# Patient Record
Sex: MALE | Race: WHITE | NOT HISPANIC OR LATINO | Employment: STUDENT | URBAN - METROPOLITAN AREA
[De-identification: names, ages, dates, MRNs, and addresses within clinical notes are randomized per-mention and may not be internally consistent; named-entity substitution may affect disease eponyms.]

---

## 2017-01-20 ENCOUNTER — HOSPITAL ENCOUNTER (OUTPATIENT)
Dept: LAB | Facility: MEDICAL CENTER | Age: 22
End: 2017-01-20
Attending: NURSE PRACTITIONER
Payer: COMMERCIAL

## 2017-01-20 ENCOUNTER — PATIENT MESSAGE (OUTPATIENT)
Dept: MEDICAL GROUP | Facility: PHYSICIAN GROUP | Age: 22
End: 2017-01-20

## 2017-01-20 ENCOUNTER — OFFICE VISIT (OUTPATIENT)
Dept: MEDICAL GROUP | Facility: PHYSICIAN GROUP | Age: 22
End: 2017-01-20
Payer: COMMERCIAL

## 2017-01-20 VITALS
OXYGEN SATURATION: 97 % | BODY MASS INDEX: 25.05 KG/M2 | HEART RATE: 68 BPM | SYSTOLIC BLOOD PRESSURE: 140 MMHG | HEIGHT: 70 IN | TEMPERATURE: 97.3 F | WEIGHT: 175 LBS | DIASTOLIC BLOOD PRESSURE: 82 MMHG | RESPIRATION RATE: 12 BRPM

## 2017-01-20 DIAGNOSIS — Z11.59 NEED FOR HEPATITIS B SCREENING TEST: ICD-10-CM

## 2017-01-20 DIAGNOSIS — S09.93XA INJURY OF JAW, INITIAL ENCOUNTER: ICD-10-CM

## 2017-01-20 DIAGNOSIS — Z11.1 SCREENING FOR TUBERCULOSIS: ICD-10-CM

## 2017-01-20 LAB — HBV SURFACE AB SER RIA-ACNC: <3.1 MIU/ML (ref 0–10)

## 2017-01-20 PROCEDURE — 36415 COLL VENOUS BLD VENIPUNCTURE: CPT

## 2017-01-20 PROCEDURE — 86706 HEP B SURFACE ANTIBODY: CPT

## 2017-01-20 PROCEDURE — 86580 TB INTRADERMAL TEST: CPT | Performed by: NURSE PRACTITIONER

## 2017-01-20 PROCEDURE — 99212 OFFICE O/P EST SF 10 MIN: CPT | Performed by: NURSE PRACTITIONER

## 2017-01-20 ASSESSMENT — PATIENT HEALTH QUESTIONNAIRE - PHQ9: CLINICAL INTERPRETATION OF PHQ2 SCORE: 0

## 2017-01-20 NOTE — Clinical Note
January 20, 2017         Patient: Navarro Clark   YOB: 1995   Date of Visit: 1/20/2017           To Whom it May Concern:    Navarro Clark was seen in my clinic on 1/20/2017 for evaluation and is safe to return to work anytime.    If you have any questions or concerns, please don't hesitate to call.        Sincerely,           CANDIDA Rodriguez  Electronically Signed

## 2017-01-20 NOTE — PROGRESS NOTES
"Subjective:     Chief Complaint   Patient presents with   • Jaw Pain     injury to jaw during basketball last night   • Immunizations     needing 2 step TB test and Hep B tieter       HPI  Navarro Clark is a 21 y.o. male here today for complaints of left-sided jaw pain. He reports that he was playing basketball last night and got hit in the chin from the front with the basketball. He reports he felt his job without a place and it made a very loud popping noise that everyone was able to hear. It now is causing pain to bite down and he feels like his jaw is moving. There was no bleeding at the time of injury. He denies any numbness of the face, drooling, vision changes, headache, or difficulty swallowing.     Additionally, patient will be attending the [employer] for further schooling and it is recommended that he get a TB test and hepatitis B titer    Diagnoses of Injury of jaw, initial encounter, Need for hepatitis B screening test, and Screening for tuberculosis were pertinent to this visit.    Allergies: Nkda  Current medicines (including changes today)  Current Outpatient Prescriptions   Medication Sig Dispense Refill   • benzonatate (TESSALON) 100 MG Cap Take 1 Cap by mouth 3 times a day as needed for Cough. 30 Cap 0     No current facility-administered medications for this visit.       He  has a past medical history of Allergy and Fracture clavicle-closed. He also has no past medical history of Congestive heart failure (CMS-[facility]), Cancer (CMS-[facility]), Infectious disease, COPD, Diabetes, ASTHMA, CAD (coronary artery disease), Stroke (CMS-[facility]), Seizure disorder (CMS-[facility]), Hypertension, Renal disorder, Liver disease, or Psychiatric disorder.      ROS  As stated in HPI      Objective:     Blood pressure 140/82, pulse 68, temperature 36.3 °C (97.3 °F), resp. rate 12, height 1.778 m (5' 10\"), weight 79.379 kg (175 lb), SpO2 97 %. Body mass index is 25.11 kg/(m^2).  Physical Exam:  General: Alert, oriented, in no acute " distress.  Eye contact is good, speech goal directed, affect calm  HEENT: conjunctiva non-injected, sclera non-icteric, EOMs intact.   Oral mucous membranes pink and moist with no lesions. Oropharynx without erythema, or exudate.   Neck: No adenopathy or masses in the cervical or supraclavicular regions.  Range of motion of jaw symmetrical without any popping, clicking, or asymmetry.   No edema or deformity of jaw to palpation        Assessment and Plan:   The following treatment plan was discussed  1. Injury of jaw, initial encounter  symptoms appear to be of TMJ etiology s/p injury with no sign of fracture or malalignment . Recommended soft foods, ice, and avoidance of large movements of opening the jaw wide.     Counseled that CT scan is the only test that I could provide that would give true information on fracture, but he could call his dentist to have x-rays done there to evaluate for any fracture as well.     Red flag warning signs reviewed with patient    2. Need for hepatitis B screening test  HEP B SURFACE AB   3. Screening for tuberculosis  PPD SKIN TEST       Followup: Return if symptoms worsen or fail to improve. sooner should new symptoms or problems arise.

## 2017-01-23 ENCOUNTER — APPOINTMENT (OUTPATIENT)
Dept: MEDICAL GROUP | Facility: PHYSICIAN GROUP | Age: 22
End: 2017-01-23
Payer: COMMERCIAL

## 2017-01-23 ENCOUNTER — HOSPITAL ENCOUNTER (OUTPATIENT)
Dept: LAB | Facility: MEDICAL CENTER | Age: 22
End: 2017-01-23
Attending: INTERNAL MEDICINE
Payer: COMMERCIAL

## 2017-01-23 ENCOUNTER — NON-PROVIDER VISIT (OUTPATIENT)
Dept: URGENT CARE | Facility: PHYSICIAN GROUP | Age: 22
End: 2017-01-23

## 2017-01-23 ENCOUNTER — PATIENT MESSAGE (OUTPATIENT)
Dept: FAMILY PLANNING/WOMEN'S HEALTH CLINIC | Facility: OTHER | Age: 22
End: 2017-01-23

## 2017-01-23 DIAGNOSIS — Z11.59 NEED FOR HEPATITIS B SCREENING TEST: ICD-10-CM

## 2017-01-23 LAB — TB WHEAL 3D P 5 TU DIAM: NORMAL MM

## 2017-01-23 NOTE — PROGRESS NOTES
Navarro Clark is a 21 y.o. male here for a non-provider visit for PPD reading -- Step 1 of 2.      Resulted in Epic under original non-provider visit? Yes   TB evaluation questionnaire scanned into chart and original given to pt?Yes    If greater than 0 mm, result verified by [employer] MA (indicate provider who verified)    If Step 1 of 2, when is pt returning for second step (delete if N/A): 1/27-1/30/17    Routed to PCP? No

## 2017-01-26 ENCOUNTER — NON-PROVIDER VISIT (OUTPATIENT)
Dept: MEDICAL GROUP | Facility: PHYSICIAN GROUP | Age: 22
End: 2017-01-26
Payer: COMMERCIAL

## 2017-01-26 ENCOUNTER — TELEPHONE (OUTPATIENT)
Dept: MEDICAL GROUP | Facility: PHYSICIAN GROUP | Age: 22
End: 2017-01-26

## 2017-01-26 DIAGNOSIS — Z23 NEED FOR HEPATITIS B VACCINATION: ICD-10-CM

## 2017-01-26 DIAGNOSIS — Z23 NEED FOR VACCINATION: ICD-10-CM

## 2017-01-26 PROCEDURE — 90471 IMMUNIZATION ADMIN: CPT | Performed by: NURSE PRACTITIONER

## 2017-01-26 PROCEDURE — 90746 HEPB VACCINE 3 DOSE ADULT IM: CPT | Performed by: NURSE PRACTITIONER

## 2017-01-26 NOTE — TELEPHONE ENCOUNTER
Pt is needing an order for his Hep B.  He was told that if he gets this one he may not need any other Hep B vaccines.... Should he get another blood test to see if he's immune in a month?

## 2017-01-27 ENCOUNTER — NON-PROVIDER VISIT (OUTPATIENT)
Dept: URGENT CARE | Facility: PHYSICIAN GROUP | Age: 22
End: 2017-01-27
Payer: COMMERCIAL

## 2017-01-27 NOTE — TELEPHONE ENCOUNTER
Hep B is always given in 3 dose series to adults who require it as the immunity usually lasts only 20 years and they require the full 3 doses. Hep B vaccine ordered     SIMONE Rodriguez.

## 2017-01-27 NOTE — NON-PROVIDER
"Navarro Clark is a 21 y.o. male here for a non-provider visit for:   HEPATITIS B 1 of 3    Reason for immunization: lack of immunity demonstrated on prior labs or testing  Immunization records indicate need for vaccine: Yes  Minimum interval has been met for this vaccine: Yes  ABN completed: Yes    VIS Dated   was given to patient Yes  All IAC Questionnaire questions were answered “No.\"    Patient tolerated injection and no adverse effects were observed or reported yes!.    Pt scheduled for next dose in series: Yes       "

## 2017-01-30 ENCOUNTER — NON-PROVIDER VISIT (OUTPATIENT)
Dept: URGENT CARE | Facility: PHYSICIAN GROUP | Age: 22
End: 2017-01-30

## 2017-01-30 DIAGNOSIS — Z11.1 PPD SCREENING TEST: ICD-10-CM

## 2017-01-30 LAB — TB WHEAL 3D P 5 TU DIAM: NORMAL MM

## 2017-01-30 PROCEDURE — 86580 TB INTRADERMAL TEST: CPT | Performed by: NURSE PRACTITIONER

## 2017-03-02 ENCOUNTER — NON-PROVIDER VISIT (OUTPATIENT)
Dept: MEDICAL GROUP | Facility: PHYSICIAN GROUP | Age: 22
End: 2017-03-02
Payer: COMMERCIAL

## 2017-03-02 DIAGNOSIS — Z23 NEED FOR HEPATITIS B VACCINATION: ICD-10-CM

## 2017-03-02 PROCEDURE — 90746 HEPB VACCINE 3 DOSE ADULT IM: CPT | Performed by: FAMILY MEDICINE

## 2017-03-02 PROCEDURE — 90471 IMMUNIZATION ADMIN: CPT | Performed by: FAMILY MEDICINE

## 2017-03-02 NOTE — NON-PROVIDER
"Navarro Clark is a 21 y.o. male here for a non-provider visit for:   HEPATITIS B 2 of 3    Reason for immunization: continue or complete series started at the office  Immunization records indicate need for vaccine: Yes  Minimum interval has been met for this vaccine: Yes  ABN completed: Not Indicated    VIS Dated   was given to patient Yes  All IAC Questionnaire questions were answered “No.\"    Patient tolerated injection and no adverse effects were observed or reported yes.    Pt scheduled for next dose in series: Yes       "

## 2017-03-06 ENCOUNTER — OFFICE VISIT (OUTPATIENT)
Dept: MEDICAL GROUP | Facility: PHYSICIAN GROUP | Age: 22
End: 2017-03-06
Payer: COMMERCIAL

## 2017-03-06 ENCOUNTER — HOSPITAL ENCOUNTER (OUTPATIENT)
Dept: RADIOLOGY | Facility: MEDICAL CENTER | Age: 22
End: 2017-03-06
Attending: NURSE PRACTITIONER
Payer: COMMERCIAL

## 2017-03-06 VITALS
TEMPERATURE: 98.1 F | RESPIRATION RATE: 16 BRPM | HEIGHT: 70 IN | SYSTOLIC BLOOD PRESSURE: 134 MMHG | HEART RATE: 56 BPM | WEIGHT: 181 LBS | OXYGEN SATURATION: 100 % | BODY MASS INDEX: 25.91 KG/M2 | DIASTOLIC BLOOD PRESSURE: 80 MMHG

## 2017-03-06 DIAGNOSIS — S69.91XA FINGER INJURY, RIGHT, INITIAL ENCOUNTER: Primary | ICD-10-CM

## 2017-03-06 DIAGNOSIS — S69.91XA FINGER INJURY, RIGHT, INITIAL ENCOUNTER: ICD-10-CM

## 2017-03-06 PROCEDURE — 99213 OFFICE O/P EST LOW 20 MIN: CPT | Performed by: NURSE PRACTITIONER

## 2017-03-06 PROCEDURE — 73140 X-RAY EXAM OF FINGER(S): CPT | Mod: RT

## 2017-03-06 NOTE — MR AVS SNAPSHOT
"        Navarro Clark   3/6/2017 3:00 PM   Office Visit   MRN: [id number]    Department:  [facility]   Dept Phone:  [phone]    Description:  Male : 1995   Provider:  FABRIZIO Luong           Reason for Visit     Hand Injury rt hand injury; playing basketball 2 days ago      Allergies as of 3/6/2017     Allergen Noted Reactions    Nkda [No Known Drug Allergy] 2007         You were diagnosed with     Finger injury, right, initial encounter   [[id number]]  -  Primary       Vital Signs     Blood Pressure Pulse Temperature Respirations Height Weight    134/80 mmHg 56 36.7 °C (98.1 °F) 16 1.778 m (5' 10\") 82.101 kg (181 lb)    Body Mass Index Oxygen Saturation Smoking Status             25.97 kg/m2 100% Never Smoker          Basic Information     Date Of Birth Sex Race Ethnicity Preferred Language    1995 Male White Non- English      Problem List              ICD-10-CM Priority Class Noted - Resolved    Acne L70.9   2010 - Present    Bulging discs [id number]   2012 - Present    Alopecia L65.9   2016 - Present    Epistaxis, recurrent R04.0   2016 - Present      Health Maintenance        Date Due Completion Dates    IMM HEP A VACCINE (1 of 2 - Standard Series) 1995 ---    IMM VARICELLA (CHICKENPOX) VACCINE (1 of 2 - 2 Dose Adolescent Series) 2007 ---    IMM HEP B VACCINE (3 of 3 - Primary Series) 2017 3/2/2017, 2017    IMM DTaP/Tdap/Td Vaccine (2 - Td) 2022            Current Immunizations     HPV Quadrivalent Vaccine (GARDASIL) 2012, 3/6/2012, 2012    Hepatitis B Vaccine Recombivax (Adol/Adult) 3/2/2017, 2017    Influenza Vaccine Pediatric 2009    Influenza Vaccine Quad Inj (Pf) 10/16/2014    Influenza Vaccine Quad Inj (Preserved) 10/7/2016    Meningococcal Conjugate Vaccine MCV4 (Menactra) 2012    Tdap Vaccine 2012    Tuberculin Skin Test 2017, 2017, 2014      Below and/or attached " are the medications your provider expects you to take. Review all of your home medications and newly ordered medications with your provider and/or pharmacist. Follow medication instructions as directed by your provider and/or pharmacist. Please keep your medication list with you and share with your provider. Update the information when medications are discontinued, doses are changed, or new medications (including over-the-counter products) are added; and carry medication information at all times in the event of emergency situations     Allergies:  NKDA - (reactions not documented)               Medications  Valid as of: March 06, 2017 -  3:17 PM    Generic Name Brand Name Tablet Size Instructions for use    Benzonatate (Cap) TESSALON 100 MG Take 1 Cap by mouth 3 times a day as needed for Cough.        .                 Medicines prescribed today were sent to:     [facility][id number] - [place], [place] - [address] AT [address] [address]    Phone: [phone] Fax: [phone]    Open 24 Hours?: No      Medication refill instructions:       If your prescription bottle indicates you have medication refills left, it is not necessary to call your provider’s office. Please contact your pharmacy and they will refill your medication.    If your prescription bottle indicates you do not have any refills left, you may request refills at any time through one of the following ways: The online [employer] system (except Urgent Care), by calling your provider’s office, or by asking your pharmacy to contact your provider’s office with a refill request. Medication refills are processed only during regular business hours and may not be available until the next business day. Your provider may request additional information or to have a follow-up visit with you prior to refilling your medication.   *Please Note: Medication refills are assigned a new Rx number when refilled electronically. Your  pharmacy may indicate that no refills were authorized even though a new prescription for the same medication is available at the pharmacy. Please request the medicine by name with the pharmacy before contacting your provider for a refill.        Your To Do List     Future Labs/Procedures Complete By Expires    DX-FINGER(S) 2+ RIGHT  As directed 3/6/2018         [employer] Access Code: Activation code not generated  Current [employer] Status: Active

## 2017-03-06 NOTE — PROGRESS NOTES
"Subjective:      Navarro Clark is a 21 y.o. male who presents with Hand Injury            Hand Injury    Navarro is here today to discuss the following new problem.  He is a patient of Dr. Wren, this is his first visit with myself.    1. Finger injury, right, initial encounter  Patient injured his right pointer finger 2 days ago while playing basketball.  He states that it was a jam type injury.  He reports significant pain, swelling and pain with any flexion.  He has not had any treatment or imaging since the injury.  He denies any change in sensation or weakness.  He is right-hand dominant.    Active Ambulatory Problems     Diagnosis Date Noted   • Acne 06/02/2010   • Bulging discs 09/27/2012   • Alopecia 01/12/2016   • Epistaxis, recurrent 01/12/2016     Resolved Ambulatory Problems     Diagnosis Date Noted   • Rash 01/12/2016   • Cough 10/07/2016     Past Medical History   Diagnosis Date   • Allergy    • Fracture clavicle-closed      Review of Systems   Musculoskeletal:        See HPI          Objective:     /80 mmHg  Pulse 56  Temp(Src) 36.7 °C (98.1 °F)  Resp 16  Ht 1.778 m (5' 10\")  Wt 82.101 kg (181 lb)  BMI 25.97 kg/m2  SpO2 100%     Physical Exam   Constitutional: He is oriented to person, place, and time. He appears well-developed and well-nourished.   Cardiovascular: Normal rate.    Pulmonary/Chest: Effort normal.   Musculoskeletal:        Right hand: He exhibits decreased range of motion, tenderness, bony tenderness and swelling. He exhibits no deformity.        Hands:  Neurological: He is alert and oriented to person, place, and time.   Nursing note and vitals reviewed.              Assessment/Plan:     1. Finger injury, right, initial encounter  Xray today  Splint supplies given  Consider referral, pending xray results.    - DX-FINGER(S) 2+ RIGHT; Future        "

## 2017-03-22 ENCOUNTER — PATIENT MESSAGE (OUTPATIENT)
Dept: FAMILY PLANNING/WOMEN'S HEALTH CLINIC | Facility: OTHER | Age: 22
End: 2017-03-22

## 2017-03-22 DIAGNOSIS — Z78.9 VARICELLA VACCINATION STATUS UNKNOWN: ICD-10-CM

## 2017-03-30 ENCOUNTER — HOSPITAL ENCOUNTER (OUTPATIENT)
Dept: LAB | Facility: MEDICAL CENTER | Age: 22
End: 2017-03-30
Attending: NURSE PRACTITIONER
Payer: COMMERCIAL

## 2017-03-30 ENCOUNTER — HOSPITAL ENCOUNTER (OUTPATIENT)
Dept: LAB | Facility: MEDICAL CENTER | Age: 22
End: 2017-03-30
Attending: FAMILY MEDICINE
Payer: COMMERCIAL

## 2017-03-30 DIAGNOSIS — Z11.59 NEED FOR HEPATITIS B SCREENING TEST: ICD-10-CM

## 2017-03-30 DIAGNOSIS — Z78.9 VARICELLA VACCINATION STATUS UNKNOWN: ICD-10-CM

## 2017-03-30 LAB — HBV SURFACE AB SER RIA-ACNC: >1000 MIU/ML (ref 0–10)

## 2017-03-30 PROCEDURE — 36415 COLL VENOUS BLD VENIPUNCTURE: CPT

## 2017-03-30 PROCEDURE — 86787 VARICELLA-ZOSTER ANTIBODY: CPT

## 2017-03-30 PROCEDURE — 86706 HEP B SURFACE ANTIBODY: CPT

## 2017-04-01 LAB — VZV IGG SER IA-ACNC: 324 IV

## 2017-08-18 ENCOUNTER — OFFICE VISIT (OUTPATIENT)
Dept: URGENT CARE | Facility: CLINIC | Age: 22
End: 2017-08-18
Payer: COMMERCIAL

## 2017-08-18 ENCOUNTER — APPOINTMENT (OUTPATIENT)
Dept: RADIOLOGY | Facility: IMAGING CENTER | Age: 22
End: 2017-08-18
Attending: PHYSICIAN ASSISTANT
Payer: COMMERCIAL

## 2017-08-18 VITALS
HEART RATE: 59 BPM | RESPIRATION RATE: 18 BRPM | BODY MASS INDEX: 24.77 KG/M2 | HEIGHT: 70 IN | SYSTOLIC BLOOD PRESSURE: 112 MMHG | TEMPERATURE: 97.9 F | WEIGHT: 173 LBS | DIASTOLIC BLOOD PRESSURE: 66 MMHG | OXYGEN SATURATION: 100 %

## 2017-08-18 DIAGNOSIS — S19.9XXA NECK INJURY, INITIAL ENCOUNTER: ICD-10-CM

## 2017-08-18 DIAGNOSIS — S13.4XXA: ICD-10-CM

## 2017-08-18 PROCEDURE — 72040 X-RAY EXAM NECK SPINE 2-3 VW: CPT | Mod: TC | Performed by: PHYSICIAN ASSISTANT

## 2017-08-18 PROCEDURE — 99214 OFFICE O/P EST MOD 30 MIN: CPT | Performed by: PHYSICIAN ASSISTANT

## 2017-08-18 RX ORDER — CHLORAL HYDRATE 500 MG
1000 CAPSULE ORAL DAILY
COMMUNITY

## 2017-08-18 RX ORDER — KETOROLAC TROMETHAMINE 30 MG/ML
60 INJECTION, SOLUTION INTRAMUSCULAR; INTRAVENOUS ONCE
Status: COMPLETED | OUTPATIENT
Start: 2017-08-18 | End: 2017-08-18

## 2017-08-18 RX ORDER — CYCLOBENZAPRINE HCL 10 MG
10 TABLET ORAL 3 TIMES DAILY PRN
Qty: 30 TAB | Refills: 0 | Status: SHIPPED | OUTPATIENT
Start: 2017-08-18 | End: 2017-08-28

## 2017-08-18 RX ADMIN — KETOROLAC TROMETHAMINE 60 MG: 30 INJECTION, SOLUTION INTRAMUSCULAR; INTRAVENOUS at 08:33

## 2017-08-18 ASSESSMENT — ENCOUNTER SYMPTOMS
SENSORY CHANGE: 0
FOCAL WEAKNESS: 0
CHILLS: 0
TREMORS: 0
SHORTNESS OF BREATH: 0
TINGLING: 0
SEIZURES: 0
NECK PAIN: 1
FEVER: 0
BLURRED VISION: 0
LOSS OF CONSCIOUSNESS: 0
PALPITATIONS: 0
DOUBLE VISION: 0
HEADACHES: 0
DIZZINESS: 0
SPEECH CHANGE: 0
COUGH: 0

## 2017-08-18 NOTE — PATIENT INSTRUCTIONS
Acute Torticollis  Torticollis is a condition in which the muscles of the neck tighten (contract) abnormally, causing the neck to twist and the head to move into an unnatural position. Torticollis that develops suddenly is called acute torticollis. If torticollis becomes chronic and is left untreated, the face and neck can become deformed.  CAUSES  This condition may be caused by:  · Sleeping in an awkward position (common).  · Extending or twisting the neck muscles beyond their normal position.  · Infection.  In some cases, the cause may not be known.  SYMPTOMS  Symptoms of this condition include:  · An unnatural position of the head.  · Neck pain.  · A limited ability to move the neck.  · Twisting of the neck to one side.  DIAGNOSIS  This condition is diagnosed with a physical exam. You may also have imaging tests, such as an X-ray, CT scan, or MRI.  TREATMENT  Treatment for this condition involves trying to relax the neck muscles. It may include:  · Medicines or shots.  · Physical therapy.  · Surgery. This may be done in severe cases.  HOME CARE INSTRUCTIONS  · Take medicines only as directed by your health care provider.  · Do stretching exercises and massage your neck as directed by your health care provider.  · Keep all follow-up visits as directed by your health care provider. This is important.  SEEK MEDICAL CARE IF:  · You develop a fever.  SEEK IMMEDIATE MEDICAL CARE IF:  · You develop difficulty breathing.  · You develop noisy breathing (stridor).  · You start drooling.  · You have trouble swallowing or have pain with swallowing.  · You develop numbness or weakness in your hands or feet.  · You have changes in your speech, understanding, or vision.  · Your pain gets worse.     This information is not intended to replace advice given to you by your health care provider. Make sure you discuss any questions you have with your health care provider.     Document Released: 12/15/2001 Document Revised:  05/03/2016 Document Reviewed: 12/14/2015  [employer] Interactive Patient Education ©2016 [employer]

## 2017-08-18 NOTE — PROGRESS NOTES
Subjective:      Navarro Clark is a 22 y.o. male who presents with Neck Injury            Neck Pain   This is a new problem. The current episode started yesterday (From hyper extending his neck playing basketball). The problem occurs constantly. The problem has been gradually worsening. The pain is associated with a twisting injury. The pain is present in the left side. The quality of the pain is described as aching. The pain is moderate. The symptoms are aggravated by position. Pertinent negatives include no chest pain, fever, headaches or tingling.       Review of Systems   Constitutional: Negative for fever and chills.   Eyes: Negative for blurred vision and double vision.   Respiratory: Negative for cough and shortness of breath.    Cardiovascular: Negative for chest pain and palpitations.   Musculoskeletal: Positive for neck pain.   Skin: Negative for rash.   Neurological: Negative for dizziness, tingling, tremors, sensory change, speech change, focal weakness, seizures, loss of consciousness and headaches.     All other systems reviewed and are negative.  PMH:  has a past medical history of Allergy and Fracture clavicle-closed. He also has no past medical history of Congestive heart failure (CMS-[facility]), Cancer (CMS-[facility]), Infectious disease, COPD, Diabetes, ASTHMA, CAD (coronary artery disease), Stroke (CMS-[facility]), Seizure disorder (CMS-[facility]), Hypertension, Renal disorder, Liver disease, or Psychiatric disorder.  MEDS:   Current outpatient prescriptions:   •  Multiple Vitamins-Minerals (MULTIVITAMIN ADULT PO), Take  by mouth., Disp: , Rfl:   •  WHEY PROTEIN PO, Take  by mouth., Disp: , Rfl:   •  Omega-3 Fatty Acids (FISH OIL) 1000 MG Cap capsule, Take 1,000 mg by mouth 3 times a day, with meals., Disp: , Rfl:   •  cyclobenzaprine (FLEXERIL) 10 MG Tab, Take 1 Tab by mouth 3 times a day as needed for up to 10 days., Disp: 30 Tab, Rfl: 0  •  benzonatate (TESSALON) 100 MG Cap, Take 1 Cap by mouth 3 times a day as  "needed for Cough., Disp: 30 Cap, Rfl: 0  ALLERGIES:   Allergies   Allergen Reactions   • Nkda [No Known Drug Allergy]      SURGHX: History reviewed. No pertinent past surgical history.  SOCHX:  reports that he has never smoked. He has never used smokeless tobacco. He reports that he does not drink alcohol or use illicit drugs.  FH: Family history was reviewed, no pertinent findings to report  Medications, Allergies, and current problem list reviewed today in Epic      Objective:     /66 mmHg  Pulse 59  Temp(Src) 36.6 °C (97.9 °F)  Resp 18  Ht 1.778 m (5' 10\")  Wt 78.472 kg (173 lb)  BMI 24.82 kg/m2  SpO2 100%     Physical Exam   Constitutional: He is oriented to person, place, and time. He appears well-developed and well-nourished.   Neck:       Cardiovascular: Normal rate, regular rhythm, normal heart sounds, intact distal pulses and normal pulses.    Pulmonary/Chest: Effort normal and breath sounds normal.   Musculoskeletal: He exhibits tenderness. He exhibits no edema or deformity.   Neurological: He is alert and oriented to person, place, and time. He has normal reflexes. He displays normal reflexes. He exhibits normal muscle tone. Coordination normal.   Skin: Skin is warm and dry.   Psychiatric: He has a normal mood and affect. His behavior is normal. Judgment and thought content normal.   Vitals reviewed.         8/18/2017 8:47 AM    HISTORY/REASON FOR EXAM:  Neck pain after injury playing basketball last night.      TECHNIQUE/EXAM DESCRIPTION AND NUMBER OF VIEWS:  Cervical spine series, 3 views.    COMPARISON:  11/11/2014    FINDINGS:  Alignment demonstrates no subluxation. There is mild straightening of the normal lordotic curvature possibly due to head positioning or muscle spasm.    There is no acute fracture.    Disc spaces are maintained. There is no arthropathy. C1-2 level is unremarkable.    Prevertebral soft tissues are unremarkable.         Impression        1.  Slight loss of the normal " lordotic curvature possibly due to head positioning or muscle spasm. There is no fracture or spondylolisthesis.          Assessment/Plan:   Patient is a 22-year-old male who presents with neck pain since yesterday. Patient states that he was playing basketball when he hyperextended his neck feeling a pop. Since then he has been unable to move his neck. His muscles feel very tense. He denies any pain or numbness down the arms. Vital signs normal. He has no range of motion of neck.  strength 5 out of 5.  Pain palpated on the left side of the neck. Due to force of the injury x-rays were done which were negative.    1. Traumatic torticollis, initial encounter    - ketorolac (TORADOL) injection 60 mg; 2 mL by Intramuscular route Once.  - cyclobenzaprine (FLEXERIL) 10 MG Tab; Take 1 Tab by mouth 3 times a day as needed for up to 10 days.  Dispense: 30 Tab; Refill: 0    Differential diagnosis, natural history, supportive care, and indications for immediate follow-up discussed at length.   Follow-up with primary care provider within 4-5 days, emergency room precautions discussed.  Patient and/or family appears understanding of information.

## 2017-08-18 NOTE — MR AVS SNAPSHOT
"        Navarro Escorial   2017 8:15 AM   Office Visit   MRN: [id number]    Department:  [facility]   Dept Phone:  [phone]    Description:  Male : 1995   Provider:  Dexter Ramirez PA-C           Reason for Visit     Neck Injury X last night, Injured neck playing basketball, little motion       Allergies as of 2017     Allergen Noted Reactions    Nkda [No Known Drug Allergy] 2007         You were diagnosed with     Traumatic torticollis, initial encounter   [268181]         Vital Signs     Blood Pressure Pulse Temperature Respirations Height Weight    112/66 mmHg 59 36.6 °C (97.9 °F) 18 1.778 m (5' 10\") 78.472 kg (173 lb)    Body Mass Index Oxygen Saturation Smoking Status             24.82 kg/m2 100% Never Smoker          Basic Information     Date Of Birth Sex Race Ethnicity Preferred Language    1995 Male White Non- English      Problem List              ICD-10-CM Priority Class Noted - Resolved    Acne L70.9   2010 - Present    Bulging discs [id number]   2012 - Present    Alopecia L65.9   2016 - Present    Epistaxis, recurrent R04.0   2016 - Present      Health Maintenance        Date Due Completion Dates    IMM HEP A VACCINE (1 of 2 - Standard Series) 1995 ---    IMM VARICELLA (CHICKENPOX) VACCINE (1 of 2 - 2 Dose Adolescent Series) 2007 ---    IMM HEP B VACCINE (3 of 3 - Primary Series) 2017 3/2/2017, 2017    IMM INFLUENZA (1) 2017 10/7/2016, 10/16/2014, 2009    IMM DTaP/Tdap/Td Vaccine (2 - Td) 2022            Current Immunizations     HPV Quadrivalent Vaccine (GARDASIL) 2012, 3/6/2012, 2012    Hepatitis B Vaccine Recombivax (Adol/Adult) 3/2/2017, 2017    Influenza Vaccine Pediatric 2009    Influenza Vaccine Quad Inj (Pf) 10/16/2014    Influenza Vaccine Quad Inj (Preserved) 10/7/2016    Meningococcal Conjugate Vaccine MCV4 (Menactra) 2012    Tdap Vaccine 2012    Tuberculin " Skin Test 1/30/2017, 1/20/2017, 5/16/2014      Below and/or attached are the medications your provider expects you to take. Review all of your home medications and newly ordered medications with your provider and/or pharmacist. Follow medication instructions as directed by your provider and/or pharmacist. Please keep your medication list with you and share with your provider. Update the information when medications are discontinued, doses are changed, or new medications (including over-the-counter products) are added; and carry medication information at all times in the event of emergency situations     Allergies:  NKDA - (reactions not documented)               Medications  Valid as of: August 18, 2017 -  9:47 AM    Generic Name Brand Name Tablet Size Instructions for use    Benzonatate (Cap) TESSALON 100 MG Take 1 Cap by mouth 3 times a day as needed for Cough.        Cyclobenzaprine HCl (Tab) FLEXERIL 10 MG Take 1 Tab by mouth 3 times a day as needed for up to 10 days.        Multiple Vitamins-Minerals   Take  by mouth.        Omega-3 Fatty Acids (Cap) fish oil 1000 MG Take 1,000 mg by mouth 3 times a day, with meals.        Protein   Take  by mouth.        .                 Medicines prescribed today were sent to:     [facility] [address] [place] [address] (), [place] - [address]    [address] () [place] [address]    Phone: [phone] Fax: [phone]    Open 24 Hours?: No      Medication refill instructions:       If your prescription bottle indicates you have medication refills left, it is not necessary to call your provider’s office. Please contact your pharmacy and they will refill your medication.    If your prescription bottle indicates you do not have any refills left, you may request refills at any time through one of the following ways: The online [employer] system (except Urgent Care), by calling your provider’s office, or by asking your pharmacy to contact your provider’s office with a refill  request. Medication refills are processed only during regular business hours and may not be available until the next business day. Your provider may request additional information or to have a follow-up visit with you prior to refilling your medication.   *Please Note: Medication refills are assigned a new Rx number when refilled electronically. Your pharmacy may indicate that no refills were authorized even though a new prescription for the same medication is available at the pharmacy. Please request the medicine by name with the pharmacy before contacting your provider for a refill.        Instructions    Acute Torticollis  Torticollis is a condition in which the muscles of the neck tighten (contract) abnormally, causing the neck to twist and the head to move into an unnatural position. Torticollis that develops suddenly is called acute torticollis. If torticollis becomes chronic and is left untreated, the face and neck can become deformed.  CAUSES  This condition may be caused by:  · Sleeping in an awkward position (common).  · Extending or twisting the neck muscles beyond their normal position.  · Infection.  In some cases, the cause may not be known.  SYMPTOMS  Symptoms of this condition include:  · An unnatural position of the head.  · Neck pain.  · A limited ability to move the neck.  · Twisting of the neck to one side.  DIAGNOSIS  This condition is diagnosed with a physical exam. You may also have imaging tests, such as an X-ray, CT scan, or MRI.  TREATMENT  Treatment for this condition involves trying to relax the neck muscles. It may include:  · Medicines or shots.  · Physical therapy.  · Surgery. This may be done in severe cases.  HOME CARE INSTRUCTIONS  · Take medicines only as directed by your health care provider.  · Do stretching exercises and massage your neck as directed by your health care provider.  · Keep all follow-up visits as directed by your health care provider. This is important.  SEEK  MEDICAL CARE IF:  · You develop a fever.  SEEK IMMEDIATE MEDICAL CARE IF:  · You develop difficulty breathing.  · You develop noisy breathing (stridor).  · You start drooling.  · You have trouble swallowing or have pain with swallowing.  · You develop numbness or weakness in your hands or feet.  · You have changes in your speech, understanding, or vision.  · Your pain gets worse.     This information is not intended to replace advice given to you by your health care provider. Make sure you discuss any questions you have with your health care provider.     Document Released: 12/15/2001 Document Revised: 05/03/2016 Document Reviewed: 12/14/2015  [employer] Interactive Patient Education ©2016 [employer]            [employer] Access Code: Activation code not generated  Current [employer] Status: Active

## 2018-03-29 ENCOUNTER — OFFICE VISIT (OUTPATIENT)
Dept: DERMATOLOGY | Facility: IMAGING CENTER | Age: 23
End: 2018-03-29
Payer: COMMERCIAL

## 2018-03-29 VITALS — WEIGHT: 180 LBS | TEMPERATURE: 98.6 F | HEIGHT: 71 IN | BODY MASS INDEX: 25.2 KG/M2

## 2018-03-29 DIAGNOSIS — L63.9 ALOPECIA AREATA: ICD-10-CM

## 2018-03-29 PROCEDURE — 99202 OFFICE O/P NEW SF 15 MIN: CPT | Mod: 25 | Performed by: DERMATOLOGY

## 2018-03-29 PROCEDURE — 11900 INJECT SKIN LESIONS </W 7: CPT | Performed by: DERMATOLOGY

## 2018-03-29 ASSESSMENT — ENCOUNTER SYMPTOMS
FEVER: 0
CHILLS: 0

## 2018-03-29 NOTE — PROGRESS NOTES
Dermatology New Patient Visit    Chief Complaint   Patient presents with   • Other     alopecia       Subjective:     HPI:   Navarro Clark is a 22 y.o. male presenting for    Hair loss  Started x 2  weeks ago.   No itching/bleeding/pain  No treatments  Pt had the same issue in the past, tx'd with kenalog shot with improvement.   No heat/cold intolerance, significant weight changes, fatigue, hair changes, additional skin problems     History of skin cancer: No  History of biopsies:No  History of blistering/severe sunburns:No  Family history of skin cancer:Yes, Details: mother- SCC  Family history of atypical moles:No      Other   Pertinent negatives include no chills, fever or rash.       Past Medical History:   Diagnosis Date   • Allergy    • Fracture clavicle-closed        Current Outpatient Prescriptions on File Prior to Visit   Medication Sig Dispense Refill   • Multiple Vitamins-Minerals (MULTIVITAMIN ADULT PO) Take  by mouth.     • WHEY PROTEIN PO Take  by mouth.     • Omega-3 Fatty Acids (FISH OIL) 1000 MG Cap capsule Take 1,000 mg by mouth 3 times a day, with meals.     • benzonatate (TESSALON) 100 MG Cap Take 1 Cap by mouth 3 times a day as needed for Cough. 30 Cap 0     No current facility-administered medications on file prior to visit.        Allergies   Allergen Reactions   • Nkda [No Known Drug Allergy]        Family History   Problem Relation Age of Onset   • Diabetes Brother    • Cancer Maternal Grandfather      lung   • Stroke Paternal Grandmother    • Stroke Paternal Grandfather        Social History     Social History   • Marital status: Single     Spouse name: N/A   • Number of children: N/A   • Years of education: N/A     Occupational History   • Not on file.     Social History Main Topics   • Smoking status: Never Smoker   • Smokeless tobacco: Never Used   • Alcohol use No   • Drug use: No   • Sexual activity: No     Other Topics Concern   • Not on file     Social History Narrative   • No  "narrative on file       Review of Systems   Constitutional: Negative for chills and fever.   Skin: Negative for itching and rash.   All other systems reviewed and are negative.       Objective:     A focused cutaneous exam was completed including: scalp, hair, ears, face, eyelids, conjunctiva, lips, neck with the following pertinent findings listed below. Remaining above-listed examined areas within normal limits / negative for rashes or lesions.    Temperature 37 °C (98.6 °F), height 1.803 m (5' 11\"), weight 81.6 kg (180 lb).    Physical Exam   Constitutional: He is oriented to person, place, and time and well-developed, well-nourished, and in no distress.   HENT:   Head: Normocephalic and atraumatic.       Right Ear: External ear normal.   Left Ear: External ear normal.   Nose: Nose normal.   Eyes: Conjunctivae are normal.   Neck: Normal range of motion.   Pulmonary/Chest: Effort normal.   Neurological: He is alert and oriented to person, place, and time.   Skin: Skin is warm and dry.   Psychiatric: Mood and affect normal.   Vitals reviewed.      DATA: none applicable to review    Assessment and Plan:     1. Alopecia areata  - educated patient about diagnosis, management options, and expectations of treatment  - discussed associated diseases (thyroid disease, vitiligo, etc)  - discussed chance of spontaneous hair regrowth, and that no intervention is a reasonable management plan at this time  - offered intralesional kenalog; patient would like to proceed with treatment  INTRALESIONAL KENALOG:  Discussed risks and benefits of intralesional kenalog injections, including skin atrophy, discoloration, minimal risk of infection. Patient verbally agreed. ILK 2.5mg/cc x 0.2cc injected into the area on the left occipital scalp. Patient tolerated procedure well, no complications. Aftercare discussed.       Followup: Return for f/u AA 4-5 weeks.    Monisha Saunders M.D.        "

## 2018-04-03 ENCOUNTER — OFFICE VISIT (OUTPATIENT)
Dept: DERMATOLOGY | Facility: IMAGING CENTER | Age: 23
End: 2018-04-03
Payer: COMMERCIAL

## 2018-04-03 DIAGNOSIS — L63.9 ALOPECIA AREATA: ICD-10-CM

## 2018-04-03 PROCEDURE — 11900 INJECT SKIN LESIONS </W 7: CPT | Performed by: DERMATOLOGY

## 2018-04-03 ASSESSMENT — ENCOUNTER SYMPTOMS
CHILLS: 0
FEVER: 0

## 2018-04-03 NOTE — PROGRESS NOTES
Dermatology Return Patient Visit    Chief Complaint   Patient presents with   • Alopecia       Subjective:     HPI:   Navarro Clark is a 22 y.o. male presenting for    F/u alopecia areata  Just seen last week - noted a new patch of hair loss seen last visit at vertex/crown  No itching/bleeding/pain  No treatment for this area - ILK 2.5 in area on left occipital scalp last visit    Pt has had this issue in the past, tx'd with kenalog shot with improvement.   No heat/cold intolerance, significant weight changes, fatigue, hair changes, additional skin problems       Other   Pertinent negatives include no chills, fever or rash.       Past Medical History:   Diagnosis Date   • Allergy    • Fracture clavicle-closed        Current Outpatient Prescriptions on File Prior to Visit   Medication Sig Dispense Refill   • Multiple Vitamins-Minerals (MULTIVITAMIN ADULT PO) Take  by mouth.     • WHEY PROTEIN PO Take  by mouth.     • Omega-3 Fatty Acids (FISH OIL) 1000 MG Cap capsule Take 1,000 mg by mouth 3 times a day, with meals.     • benzonatate (TESSALON) 100 MG Cap Take 1 Cap by mouth 3 times a day as needed for Cough. 30 Cap 0     No current facility-administered medications on file prior to visit.        Allergies   Allergen Reactions   • Nkda [No Known Drug Allergy]        Family History   Problem Relation Age of Onset   • Diabetes Brother    • Cancer Maternal Grandfather      lung   • Stroke Paternal Grandmother    • Stroke Paternal Grandfather        Social History     Social History   • Marital status: Single     Spouse name: N/A   • Number of children: N/A   • Years of education: N/A     Occupational History   • Not on file.     Social History Main Topics   • Smoking status: Never Smoker   • Smokeless tobacco: Never Used   • Alcohol use No   • Drug use: No   • Sexual activity: No     Other Topics Concern   • Not on file     Social History Narrative   • No narrative on file       Review of Systems   Constitutional:  Negative for chills and fever.   Skin: Negative for itching and rash.   All other systems reviewed and are negative.       Objective:     A focused cutaneous exam was completed including: scalp, hair, ears, face, eyelids, conjunctiva, lips, neck with the following pertinent findings listed below. Remaining above-listed examined areas within normal limits / negative for rashes or lesions.    There were no vitals taken for this visit.    Physical Exam   Constitutional: He is oriented to person, place, and time and well-developed, well-nourished, and in no distress.   HENT:   Head: Normocephalic and atraumatic.       Right Ear: External ear normal.   Left Ear: External ear normal.   Nose: Nose normal.   Eyes: Conjunctivae are normal.   Neck: Normal range of motion.   Pulmonary/Chest: Effort normal.   Neurological: He is alert and oriented to person, place, and time.   Skin: Skin is warm and dry.   Psychiatric: Mood and affect normal.       DATA: none applicable to review    Assessment and Plan:     1. Alopecia areata  - discussed continued management options, and expectations of treatment  - discussed associated diseases (thyroid disease, vitiligo, etc)  - discussed chance of spontaneous hair regrowth, and that no intervention is a reasonable management plan at this time  - offered intralesional kenalog; patient would like to proceed with treatment  INTRALESIONAL KENALOG:  Discussed risks and benefits of intralesional kenalog injections, including skin atrophy, discoloration, minimal risk of infection. Patient verbally agreed. ILK 2.5mg/cc x 0.3cc injected into the area on the left occipital scalp. Patient tolerated procedure well, no complications. Aftercare discussed.       Followup: Return in about 4 weeks (around 5/1/2018).    Monisha Saunders M.D.

## 2018-05-10 ENCOUNTER — OFFICE VISIT (OUTPATIENT)
Dept: DERMATOLOGY | Facility: IMAGING CENTER | Age: 23
End: 2018-05-10
Payer: COMMERCIAL

## 2018-05-10 DIAGNOSIS — L63.9 ALOPECIA AREATA: ICD-10-CM

## 2018-05-10 PROCEDURE — 11900 INJECT SKIN LESIONS </W 7: CPT | Performed by: DERMATOLOGY

## 2018-05-10 ASSESSMENT — ENCOUNTER SYMPTOMS
CHILLS: 0
FEVER: 0

## 2018-05-10 NOTE — PROGRESS NOTES
Dermatology Return Patient Visit    Chief Complaint   Patient presents with   • Hair/Scalp Problem       Subjective:     HPI:   Navarro Clark is a 22 y.o. male presenting for    Follow up, alopecia areata  Minimal improvement since last visit  New patch on the right occipital scalp  S/p ILK x 1 treatment in each previous active area (2.5)  No itching/bleeding/pain    Pt has had this issue in the past, tx'd with kenalog shot with improvement.   No heat/cold intolerance, significant weight changes, fatigue, hair changes, additional skin problems       Other   Pertinent negatives include no chills, fever or rash.       Past Medical History:   Diagnosis Date   • Allergy    • Fracture clavicle-closed        Current Outpatient Prescriptions on File Prior to Visit   Medication Sig Dispense Refill   • Multiple Vitamins-Minerals (MULTIVITAMIN ADULT PO) Take  by mouth.     • WHEY PROTEIN PO Take  by mouth.     • Omega-3 Fatty Acids (FISH OIL) 1000 MG Cap capsule Take 1,000 mg by mouth 3 times a day, with meals.     • benzonatate (TESSALON) 100 MG Cap Take 1 Cap by mouth 3 times a day as needed for Cough. 30 Cap 0     No current facility-administered medications on file prior to visit.        Allergies   Allergen Reactions   • Nkda [No Known Drug Allergy]        Family History   Problem Relation Age of Onset   • Diabetes Brother    • Cancer Maternal Grandfather      lung   • Stroke Paternal Grandmother    • Stroke Paternal Grandfather        Social History     Social History   • Marital status: Single     Spouse name: N/A   • Number of children: N/A   • Years of education: N/A     Occupational History   • Not on file.     Social History Main Topics   • Smoking status: Never Smoker   • Smokeless tobacco: Never Used   • Alcohol use No   • Drug use: No   • Sexual activity: No     Other Topics Concern   • Not on file     Social History Narrative   • No narrative on file       Review of Systems   Constitutional: Negative for  chills and fever.   Skin: Negative for itching and rash.   All other systems reviewed and are negative.       Objective:     A focused cutaneous exam was completed including: scalp, hair, ears, face, eyelids, conjunctiva, lips, neck with the following pertinent findings listed below. Remaining above-listed examined areas within normal limits / negative for rashes or lesions.    There were no vitals taken for this visit.    Physical Exam   Constitutional: He is oriented to person, place, and time and well-developed, well-nourished, and in no distress.   HENT:   Head: Normocephalic and atraumatic.       Right Ear: External ear normal.   Left Ear: External ear normal.   Nose: Nose normal.   Eyes: Conjunctivae are normal.   Neck: Normal range of motion.   Pulmonary/Chest: Effort normal.   Neurological: He is alert and oriented to person, place, and time.   Skin: Skin is warm and dry.   Psychiatric: Mood and affect normal.       DATA: none applicable to review    Assessment and Plan:     1. Alopecia areata  - discussed continued management options, and expectations of treatment  - discussed associated diseases (thyroid disease, vitiligo, etc)  - discussed chance of spontaneous hair regrowth, and that no intervention is a reasonable management plan at this time  - offered intralesional kenalog; patient would like to proceed with treatment  INTRALESIONAL KENALOG:  Discussed risks and benefits of intralesional kenalog injections, including skin atrophy, discoloration, minimal risk of infection. Patient verbally agreed. ILK 2.5mg/cc x 0.6cc total injected into the areas on the scalp. Patient tolerated procedure well, no complications. Aftercare discussed.     Followup: Return in about 4 weeks (around 6/7/2018).    Monisha Saunders M.D.

## 2018-06-13 ENCOUNTER — OFFICE VISIT (OUTPATIENT)
Dept: DERMATOLOGY | Facility: IMAGING CENTER | Age: 23
End: 2018-06-13
Payer: COMMERCIAL

## 2018-06-13 DIAGNOSIS — L63.9 ALOPECIA AREATA: ICD-10-CM

## 2018-06-13 PROCEDURE — 11900 INJECT SKIN LESIONS </W 7: CPT | Performed by: DERMATOLOGY

## 2018-06-13 ASSESSMENT — ENCOUNTER SYMPTOMS
FEVER: 0
CHILLS: 0

## 2018-06-13 NOTE — PROGRESS NOTES
Dermatology Return Patient Visit    Chief Complaint   Patient presents with   • Follow-Up       Subjective:     HPI:   Navarro Clark is a 22 y.o. male presenting for    Follow-up, alopecia areata,  Vertex, left scalp spots improved, area on right scalp not better (s/p ILK x 1 treatment to this area, 2.5, prior spots improved after 2 treatments)  Few hairs growing back  No new spot  Just finished school, stress level decreased, interning in ED    Pt has had this issue in the past, tx'd with kenalog shot with improvement.   No heat/cold intolerance, significant weight changes, fatigue, hair changes, additional skin problems       Other   Pertinent negatives include no chills, fever or rash.       Past Medical History:   Diagnosis Date   • Allergy    • Fracture clavicle-closed        Current Outpatient Prescriptions on File Prior to Visit   Medication Sig Dispense Refill   • Multiple Vitamins-Minerals (MULTIVITAMIN ADULT PO) Take  by mouth.     • WHEY PROTEIN PO Take  by mouth.     • Omega-3 Fatty Acids (FISH OIL) 1000 MG Cap capsule Take 1,000 mg by mouth 3 times a day, with meals.     • benzonatate (TESSALON) 100 MG Cap Take 1 Cap by mouth 3 times a day as needed for Cough. 30 Cap 0     No current facility-administered medications on file prior to visit.        Allergies   Allergen Reactions   • Nkda [No Known Drug Allergy]        Family History   Problem Relation Age of Onset   • Diabetes Brother    • Cancer Maternal Grandfather      lung   • Stroke Paternal Grandmother    • Stroke Paternal Grandfather        Social History     Social History   • Marital status: Single     Spouse name: N/A   • Number of children: N/A   • Years of education: N/A     Occupational History   • Not on file.     Social History Main Topics   • Smoking status: Never Smoker   • Smokeless tobacco: Never Used   • Alcohol use No   • Drug use: No   • Sexual activity: No     Other Topics Concern   • Not on file     Social History Narrative   •  No narrative on file       Review of Systems   Constitutional: Negative for chills and fever.   Skin: Negative for itching and rash.   All other systems reviewed and are negative.       Objective:     A focused cutaneous exam was completed including: scalp, hair, ears, face, eyelids, conjunctiva, lips, neck with the following pertinent findings listed below. Remaining above-listed examined areas within normal limits / negative for rashes or lesions.    There were no vitals taken for this visit.    Physical Exam   Constitutional: He is oriented to person, place, and time and well-developed, well-nourished, and in no distress.   HENT:   Head: Normocephalic and atraumatic.       Right Ear: External ear normal.   Left Ear: External ear normal.   Nose: Nose normal.   Eyes: Conjunctivae are normal.   Neck: Normal range of motion.   Pulmonary/Chest: Effort normal.   Neurological: He is alert and oriented to person, place, and time.   Skin: Skin is warm and dry.   Psychiatric: Mood and affect normal.       DATA: none applicable to review    Assessment and Plan:     1. Alopecia areata  - discussed continued management options, and expectations of treatment  - patient would like to proceed with additional treatment  INTRALESIONAL KENALOG:  Discussed risks and benefits of intralesional kenalog injections, including skin atrophy, discoloration, minimal risk of infection. Patient verbally agreed. ILK 2.5mg/cc x 0.3cc total injected into the area on the right occipital scalp. Patient tolerated procedure well, no complications. Aftercare discussed.     Followup: Return in about 4 weeks (around 7/11/2018) for f/u AA.    Monisha Saunders M.D.

## 2018-06-14 ENCOUNTER — OFFICE VISIT (OUTPATIENT)
Dept: URGENT CARE | Facility: PHYSICIAN GROUP | Age: 23
End: 2018-06-14
Payer: COMMERCIAL

## 2018-06-14 VITALS
TEMPERATURE: 97.7 F | WEIGHT: 180 LBS | DIASTOLIC BLOOD PRESSURE: 80 MMHG | HEART RATE: 74 BPM | SYSTOLIC BLOOD PRESSURE: 122 MMHG | BODY MASS INDEX: 25.2 KG/M2 | HEIGHT: 71 IN | OXYGEN SATURATION: 99 % | RESPIRATION RATE: 14 BRPM

## 2018-06-14 DIAGNOSIS — S46.911A STRAIN OF RIGHT SHOULDER, INITIAL ENCOUNTER: ICD-10-CM

## 2018-06-14 DIAGNOSIS — M54.2 NECK PAIN, ACUTE: ICD-10-CM

## 2018-06-14 PROCEDURE — 99214 OFFICE O/P EST MOD 30 MIN: CPT | Performed by: PHYSICIAN ASSISTANT

## 2018-06-14 RX ORDER — METHYLPREDNISOLONE 4 MG/1
TABLET ORAL
Qty: 1 KIT | Refills: 0 | Status: SHIPPED | OUTPATIENT
Start: 2018-06-14 | End: 2019-07-20

## 2018-06-14 ASSESSMENT — ENCOUNTER SYMPTOMS
MUSCLE WEAKNESS: 0
TINGLING: 0
CHILLS: 0
NUMBNESS: 0
NAUSEA: 0
VOMITING: 0
FEVER: 0
ABDOMINAL PAIN: 0
DIARRHEA: 0
NECK PAIN: 1
SHORTNESS OF BREATH: 0
DIZZINESS: 0

## 2018-06-14 NOTE — PROGRESS NOTES
"Subjective:      Navarro Clark is a 22 y.o. male who presents with Pain (r shoulder neck pain )            Shoulder Injury    The incident occurred at the gym. The right shoulder is affected. The incident occurred 12 to 24 hours ago. The quality of the pain is described as aching. The pain does not radiate. The pain is at a severity of 5/10. The pain is moderate. Pertinent negatives include no chest pain, muscle weakness, numbness or tingling. The symptoms are aggravated by overhead lifting. He has tried NSAIDs for the symptoms. The treatment provided mild relief.     Patient presents to urgent care reporting a 1 day history of right shoulder pain after feeling a \"pop\" while doing bench presses last night. He took ibuprofen last night without much improvement. This morning the shoulder and right side of his neck were more painful and stiff. He is right hand dominant. He denies history of shoulder injuries or distal numbness/tingling.     Review of Systems   Constitutional: Negative for chills and fever.   HENT: Negative for congestion.    Respiratory: Negative for shortness of breath.    Cardiovascular: Negative for chest pain.   Gastrointestinal: Negative for abdominal pain, diarrhea, nausea and vomiting.   Genitourinary: Negative.    Musculoskeletal: Positive for neck pain.        + right shoulder pain   Skin: Negative for rash.   Neurological: Negative for dizziness, tingling and numbness.        Objective:     /80   Pulse 74   Temp 36.5 °C (97.7 °F)   Resp 14   Ht 1.803 m (5' 11\")   Wt 81.6 kg (180 lb)   SpO2 99%   BMI 25.10 kg/m²      Physical Exam   Constitutional: He is oriented to person, place, and time. He appears well-developed and well-nourished. No distress.   HENT:   Head: Normocephalic and atraumatic.   Eyes: Pupils are equal, round, and reactive to light.   Neck: Normal range of motion.   Cardiovascular: Normal rate.    Pulmonary/Chest: Effort normal.   Musculoskeletal: Normal range of " motion.        Right shoulder: He exhibits tenderness and pain. He exhibits normal range of motion, no bony tenderness, no swelling and normal strength.   Full ROM and strength of upper extremities bilaterally.   negative apley test  negative drop arm test   Neurological: He is alert and oriented to person, place, and time.   Skin: Skin is warm and dry. He is not diaphoretic.   Psychiatric: He has a normal mood and affect. His behavior is normal.   Nursing note and vitals reviewed.            PMH:  has a past medical history of Allergy and Fracture clavicle-closed. He also has no past medical history of ASTHMA; CAD (coronary artery disease); Cancer (HCC); Congestive heart failure (HCC); COPD; Diabetes; Hypertension; Infectious disease; Liver disease; Psychiatric disorder; Renal disorder; Seizure disorder (HCC); or Stroke ([facility]).  MEDS:   Current Outpatient Prescriptions:   •  MethylPREDNISolone (MEDROL DOSEPAK) 4 MG Tablet Therapy Pack, Take as directed, Disp: 1 Kit, Rfl: 0  •  Multiple Vitamins-Minerals (MULTIVITAMIN ADULT PO), Take  by mouth., Disp: , Rfl:   •  Omega-3 Fatty Acids (FISH OIL) 1000 MG Cap capsule, Take 1,000 mg by mouth 3 times a day, with meals., Disp: , Rfl:   •  WHEY PROTEIN PO, Take  by mouth., Disp: , Rfl:   •  benzonatate (TESSALON) 100 MG Cap, Take 1 Cap by mouth 3 times a day as needed for Cough., Disp: 30 Cap, Rfl: 0  ALLERGIES:   Allergies   Allergen Reactions   • Nkda [No Known Drug Allergy]      SURGHX: No past surgical history on file.  SOCHX:  reports that he has never smoked. He has never used smokeless tobacco. He reports that he does not drink alcohol or use drugs.  FH: family history includes Cancer in his maternal grandfather; Diabetes in his brother; Stroke in his paternal grandfather and paternal grandmother.       Assessment/Plan:     1. Strain of right shoulder, initial encounter  - MethylPREDNISolone (MEDROL DOSEPAK) 4 MG Tablet Therapy Pack; Take as directed  Dispense: 1 Kit;  Refill: 0  - REFERRAL TO SPORTS MEDICINE    2. Neck pain, acute    Encouraged icing/heating 2-3 times daily. Gentle massage and stretching. Discouraged any heavy lifting until fully healed. He will follow up with Dr. Rios in 2 weeks if symptoms haven't significantly improved for further evaluation and management. The patient demonstrated a good understanding and agreed with the treatment plan.

## 2018-07-11 ENCOUNTER — OFFICE VISIT (OUTPATIENT)
Dept: DERMATOLOGY | Facility: IMAGING CENTER | Age: 23
End: 2018-07-11
Payer: COMMERCIAL

## 2018-07-11 DIAGNOSIS — L63.9 ALOPECIA AREATA: ICD-10-CM

## 2018-07-11 PROCEDURE — 99212 OFFICE O/P EST SF 10 MIN: CPT | Performed by: DERMATOLOGY

## 2018-07-11 RX ORDER — FLUOCINONIDE GEL 0.5 MG/G
1 GEL TOPICAL
Qty: 60 G | Refills: 2 | Status: SHIPPED | OUTPATIENT
Start: 2018-07-11 | End: 2019-12-23

## 2018-07-11 ASSESSMENT — ENCOUNTER SYMPTOMS
CHILLS: 0
FEVER: 0

## 2018-07-11 NOTE — PROGRESS NOTES
Dermatology Return Patient Visit    Chief Complaint   Patient presents with   • Alopecia       Subjective:     HPI:   Navarro Clark is a 22 y.o. male presenting for    Follow-up, alopecia areata  Improved initially, and then seemed to worsen  Left and right occipital scalp (s/p ILK x 3 treatments, 2.5mg/cc)  Still notes only a few hairs growing  Just finished school, stress level decreased, finished internship at ED    Pt has had this issue in the past, tx'd with kenalog shot with improvement.   No heat/cold intolerance, significant weight changes, fatigue, hair changes, additional skin problems       Other   Pertinent negatives include no chills, fever or rash.       Past Medical History:   Diagnosis Date   • Allergy    • Fracture clavicle-closed        Current Outpatient Prescriptions on File Prior to Visit   Medication Sig Dispense Refill   • MethylPREDNISolone (MEDROL DOSEPAK) 4 MG Tablet Therapy Pack Take as directed 1 Kit 0   • Multiple Vitamins-Minerals (MULTIVITAMIN ADULT PO) Take  by mouth.     • WHEY PROTEIN PO Take  by mouth.     • Omega-3 Fatty Acids (FISH OIL) 1000 MG Cap capsule Take 1,000 mg by mouth 3 times a day, with meals.     • benzonatate (TESSALON) 100 MG Cap Take 1 Cap by mouth 3 times a day as needed for Cough. 30 Cap 0     No current facility-administered medications on file prior to visit.        Allergies   Allergen Reactions   • Nkda [No Known Drug Allergy]        Family History   Problem Relation Age of Onset   • Diabetes Brother    • Cancer Maternal Grandfather      lung   • Stroke Paternal Grandmother    • Stroke Paternal Grandfather        Social History     Social History   • Marital status: Single     Spouse name: N/A   • Number of children: N/A   • Years of education: N/A     Occupational History   • Not on file.     Social History Main Topics   • Smoking status: Never Smoker   • Smokeless tobacco: Never Used   • Alcohol use No   • Drug use: No   • Sexual activity: No     Other  Topics Concern   • Not on file     Social History Narrative   • No narrative on file       Review of Systems   Constitutional: Negative for chills and fever.   Skin: Negative for itching and rash.   All other systems reviewed and are negative.       Objective:     A focused cutaneous exam was completed including: scalp, hair, ears, face, eyelids, conjunctiva, lips, neck with the following pertinent findings listed below. Remaining above-listed examined areas within normal limits / negative for rashes or lesions.    There were no vitals taken for this visit.    Physical Exam   Constitutional: He is oriented to person, place, and time and well-developed, well-nourished, and in no distress.   HENT:   Head: Normocephalic and atraumatic.       Right Ear: External ear normal.   Left Ear: External ear normal.   Nose: Nose normal.   Eyes: Conjunctivae are normal.   Neck: Normal range of motion.   Pulmonary/Chest: Effort normal.   Neurological: He is alert and oriented to person, place, and time.   Skin: Skin is warm and dry.   Psychiatric: Mood and affect normal.       DATA: none applicable to review    Assessment and Plan:     1. Alopecia areata - minimal worsening, but also evidence of mild steroid atrophy  - discussed continued management options, and expectations of treatment  - will hold off on additional injections 2/2 steroid atrophy  - can trial Rogaine (minoxidil) 5% (foam or solution) to the area of the scalp daily. Instructions given on how to apply, s/e hypertrichosis discussed  - rx for lidex 0.05% gel - wait 1-2 months before starting (allow for recovery)    Followup: Return in about 3 months (around 10/11/2018), or if symptoms worsen or fail to improve.    Monisha Saunders M.D.

## 2018-09-10 ENCOUNTER — TELEPHONE (OUTPATIENT)
Dept: DERMATOLOGY | Facility: IMAGING CENTER | Age: 23
End: 2018-09-10

## 2018-09-10 NOTE — TELEPHONE ENCOUNTER
----- Message from Navarro Clark sent at 9/8/2018  3:37 PM PDT -----  Regarding: Non-Urgent Medical Question  Contact: [phone]  Hi Dr. Saunders,     I know we have an appointment scheduled for October 10th but I wanted ask a couple questions. I know you wanted to wait and see what the steroid injections would do in time for me and not risk over treating. I've been using the Minoxidil like you suggested on my two spots of alopecia for the past month. The upper right spot has had some good hair re-growth  since the last appointment, but the one on my left side is still completely empty of hair and shows no sign of re-growth. Should I make an earlier appointment to be seen before October 10th? Is there anything else besides Minoxidil I should try in the meantime?      Best,     Navarro

## 2018-09-11 ENCOUNTER — OFFICE VISIT (OUTPATIENT)
Dept: URGENT CARE | Facility: PHYSICIAN GROUP | Age: 23
End: 2018-09-11
Payer: COMMERCIAL

## 2018-09-11 VITALS
TEMPERATURE: 98.3 F | OXYGEN SATURATION: 98 % | SYSTOLIC BLOOD PRESSURE: 110 MMHG | HEART RATE: 77 BPM | WEIGHT: 180 LBS | RESPIRATION RATE: 16 BRPM | DIASTOLIC BLOOD PRESSURE: 70 MMHG | BODY MASS INDEX: 25.2 KG/M2 | HEIGHT: 71 IN

## 2018-09-11 DIAGNOSIS — J03.90 TONSILLITIS: ICD-10-CM

## 2018-09-11 PROCEDURE — 99214 OFFICE O/P EST MOD 30 MIN: CPT | Performed by: FAMILY MEDICINE

## 2018-09-11 RX ORDER — AMOXICILLIN 500 MG/1
500 CAPSULE ORAL 3 TIMES DAILY
Qty: 30 CAP | Refills: 0 | Status: SHIPPED | OUTPATIENT
Start: 2018-09-11 | End: 2018-09-21

## 2018-09-11 RX ORDER — ASPIRIN 325 MG
325 TABLET ORAL EVERY 6 HOURS PRN
COMMUNITY
End: 2019-12-23

## 2018-09-11 ASSESSMENT — ENCOUNTER SYMPTOMS
HOARSE VOICE: 0
STRIDOR: 0
SWOLLEN GLANDS: 1
HEADACHES: 0
TROUBLE SWALLOWING: 0

## 2018-09-11 ASSESSMENT — PAIN SCALES - GENERAL: PAINLEVEL: NO PAIN

## 2018-09-11 NOTE — PROGRESS NOTES
"Subjective:   Navarro Rubio a 23 y.o. male who presents for Pharyngitis (x 3 days)    Pharyngitis    This is a new problem. The current episode started in the past 7 days. The problem has been rapidly worsening. Neither side of throat is experiencing more pain than the other. There has been no fever. The pain is moderate. Associated symptoms include swollen glands. Pertinent negatives include no congestion, headaches, hoarse voice, stridor or trouble swallowing.     Review of Systems   HENT: Negative for congestion, hoarse voice and trouble swallowing.    Respiratory: Negative for stridor.    Neurological: Negative for headaches.     Allergies   Allergen Reactions   • Nkda [No Known Drug Allergy]       Objective:   /70   Pulse 77   Temp 36.8 °C (98.3 °F)   Resp 16   Ht 1.803 m (5' 11\")   Wt 81.6 kg (180 lb)   SpO2 98%   BMI 25.10 kg/m²   Physical Exam   Constitutional: He is oriented to person, place, and time. He appears well-developed and well-nourished. No distress.   HENT:   Head: Normocephalic and atraumatic.   Mouth/Throat: Uvula is midline. Posterior oropharyngeal edema and posterior oropharyngeal erythema present. No oropharyngeal exudate or tonsillar abscesses.   Eyes: Pupils are equal, round, and reactive to light. Conjunctivae and EOM are normal.   Cardiovascular: Normal rate, regular rhythm and intact distal pulses.    No murmur heard.  Pulmonary/Chest: Effort normal and breath sounds normal. No respiratory distress.   Abdominal: Soft. He exhibits no distension. There is no tenderness.   Neurological: He is alert and oriented to person, place, and time. He has normal reflexes. No sensory deficit.   Skin: Skin is warm and dry.   Psychiatric: He has a normal mood and affect. His behavior is normal.   Vitals reviewed.    Assessment/Plan:   Assessment    1. Tonsillitis  - amoxicillin (AMOXIL) 500 MG Cap; Take 1 Cap by mouth 3 times a day for 10 days.  Dispense: 30 Cap; Refill: 0    Other " orders  - aspirin (ASA) 325 MG Tab; Take 325 mg by mouth every 6 hours as needed.    Differential diagnosis, natural history, supportive care, and indications for immediate follow-up discussed.  Return if symptoms worsen or fail to improve.

## 2018-09-11 NOTE — PATIENT INSTRUCTIONS
Tonsillitis  Tonsillitis is an infection of the throat that causes the tonsils to become red, tender, and swollen. Tonsils are collections of lymphoid tissue at the back of the throat. Each tonsil has crevices (crypts). Tonsils help fight nose and throat infections and keep infection from spreading to other parts of the body for the first 18 months of life.  What are the causes?  Sudden (acute) tonsillitis is usually caused by infection with streptococcal bacteria. Long-lasting (chronic) tonsillitis occurs when the crypts of the tonsils become filled with pieces of food and bacteria, which makes it easy for the tonsils to become repeatedly infected.  What are the signs or symptoms?  Symptoms of tonsillitis include:  · A sore throat, with possible difficulty swallowing.  · White patches on the tonsils.  · Fever.  · Tiredness.  · New episodes of snoring during sleep, when you did not snore before.  · Small, foul-smelling, yellowish-white pieces of material (tonsilloliths) that you occasionally cough up or spit out. The tonsilloliths can also cause you to have bad breath.  How is this diagnosed?  Tonsillitis can be diagnosed through a physical exam. Diagnosis can be confirmed with the results of lab tests, including a throat culture.  How is this treated?  The goals of tonsillitis treatment include the reduction of the severity and duration of symptoms and prevention of associated conditions. Symptoms of tonsillitis can be improved with the use of steroids to reduce the swelling. Tonsillitis caused by bacteria can be treated with antibiotic medicines. Usually, treatment with antibiotic medicines is started before the cause of the tonsillitis is known. However, if it is determined that the cause is not bacterial, antibiotic medicines will not treat the tonsillitis. If attacks of tonsillitis are severe and frequent, your health care provider may recommend surgery to remove the tonsils (tonsillectomy).  Follow these  instructions at home:  · Rest as much as possible and get plenty of sleep.  · Drink plenty of fluids. While the throat is very sore, eat soft foods or liquids, such as sherbet, soups, or instant breakfast drinks.  · Eat frozen ice pops.  · Gargle with a warm or cold liquid to help soothe the throat. Mix 1/4 teaspoon of salt and 1/4 teaspoon of baking soda in 8 oz of water.  Contact a health care provider if:  · Large, tender lumps develop in your neck.  · A rash develops.  · A green, yellow-brown, or bloody substance is coughed up.  · You are unable to swallow liquids or food for 24 hours.  · You notice that only one of the tonsils is swollen.  Get help right away if:  · You develop any new symptoms such as vomiting, severe headache, stiff neck, chest pain, or trouble breathing or swallowing.  · You have severe throat pain along with drooling or voice changes.  · You have severe pain, unrelieved with recommended medications.  · You are unable to fully open the mouth.  · You develop redness, swelling, or severe pain anywhere in the neck.  · You have a fever.  This information is not intended to replace advice given to you by your health care provider. Make sure you discuss any questions you have with your health care provider.  Document Released: 09/27/2006 Document Revised: 05/25/2017 Document Reviewed: 06/06/2014  [employer] Patient Education © 2017 [employer]

## 2018-10-10 ENCOUNTER — OFFICE VISIT (OUTPATIENT)
Dept: DERMATOLOGY | Facility: IMAGING CENTER | Age: 23
End: 2018-10-10
Payer: COMMERCIAL

## 2018-10-10 DIAGNOSIS — L63.9 ALOPECIA AREATA: ICD-10-CM

## 2018-10-10 PROCEDURE — 11901 INJECT SKIN LESIONS >7: CPT | Performed by: DERMATOLOGY

## 2018-10-10 PROCEDURE — 99212 OFFICE O/P EST SF 10 MIN: CPT | Mod: 25 | Performed by: DERMATOLOGY

## 2018-10-10 ASSESSMENT — ENCOUNTER SYMPTOMS
FEVER: 0
CHILLS: 0

## 2018-10-10 NOTE — PROGRESS NOTES
Dermatology Return Patient Visit    Chief Complaint   Patient presents with   • Follow-Up       Subjective:     HPI:   Navarro Clark is a 22 y.o. male presenting for    Follow-up, alopecia areata  Unsure if improved, but feels like may have some hairs growing in  Pt is using the Rogaine daily, no other topicals/steroids    Pt has had this issue in the past, tx'd with kenalog shot with improvement.   No heat/cold intolerance, significant weight changes, fatigue, hair changes, additional skin problems       Other   Pertinent negatives include no chills, fever or rash.       Past Medical History:   Diagnosis Date   • Allergy    • Fracture clavicle-closed        Current Outpatient Prescriptions on File Prior to Visit   Medication Sig Dispense Refill   • aspirin (ASA) 325 MG Tab Take 325 mg by mouth every 6 hours as needed.     • fluocinonide (LIDEX) 0.05 % gel Apply 1 Application to affected area(s) every bedtime. 60 g 2   • MethylPREDNISolone (MEDROL DOSEPAK) 4 MG Tablet Therapy Pack Take as directed 1 Kit 0   • Multiple Vitamins-Minerals (MULTIVITAMIN ADULT PO) Take  by mouth.     • WHEY PROTEIN PO Take  by mouth.     • Omega-3 Fatty Acids (FISH OIL) 1000 MG Cap capsule Take 1,000 mg by mouth 3 times a day, with meals.     • benzonatate (TESSALON) 100 MG Cap Take 1 Cap by mouth 3 times a day as needed for Cough. 30 Cap 0     No current facility-administered medications on file prior to visit.        Allergies   Allergen Reactions   • Nkda [No Known Drug Allergy]        Family History   Problem Relation Age of Onset   • Diabetes Brother    • Cancer Maternal Grandfather         lung   • Stroke Paternal Grandmother    • Stroke Paternal Grandfather        Social History     Social History   • Marital status: Single     Spouse name: N/A   • Number of children: N/A   • Years of education: N/A     Occupational History   • Not on file.     Social History Main Topics   • Smoking status: Never Smoker   • Smokeless tobacco:  Never Used   • Alcohol use No   • Drug use: No   • Sexual activity: No     Other Topics Concern   • Not on file     Social History Narrative   • No narrative on file       Review of Systems   Constitutional: Negative for chills and fever.   Skin: Negative for itching and rash.   All other systems reviewed and are negative.       Objective:     A focused cutaneous exam was completed including: scalp, hair, ears, face, eyelids, conjunctiva, lips, neck with the following pertinent findings listed below. Remaining above-listed examined areas within normal limits / negative for rashes or lesions.    There were no vitals taken for this visit.    Physical Exam   Constitutional: He is oriented to person, place, and time and well-developed, well-nourished, and in no distress.   HENT:   Head: Normocephalic and atraumatic.       Right Ear: External ear normal.   Left Ear: External ear normal.   Nose: Nose normal.   Eyes: Conjunctivae are normal.   Neck: Normal range of motion.   Pulmonary/Chest: Effort normal.   Neurological: He is alert and oriented to person, place, and time.   Skin: Skin is warm and dry.   Psychiatric: Mood and affect normal.       DATA: none applicable to review    Assessment and Plan:     1. Alopecia areata - new hair growth noted, recovery of mild atrophy  - discussed continued management options, and expectations of treatment  INTRALESIONAL KENALOG:  Discussed risks and benefits of intralesional kenalog injections, including skin atrophy, discoloration, minimal risk of infection. Patient verbally agreed. ILK 2.5mg/cc x 0.5cc injected into the areas on the occipital scalp. Patient tolerated procedure well, no complications. Aftercare discussed.   - cont Rogaine (minoxidil) 5% (foam or solution) to the area of the scalp daily. Instructions given on how to apply, s/e hypertrichosis discussed  - rx for lidex 0.05% gel - can start    Followup: Return for AA, 6-8 weeks.    Monisha Saunders M.D.

## 2018-11-27 ENCOUNTER — OFFICE VISIT (OUTPATIENT)
Dept: DERMATOLOGY | Facility: IMAGING CENTER | Age: 23
End: 2018-11-27
Payer: COMMERCIAL

## 2018-11-27 DIAGNOSIS — L63.9 ALOPECIA AREATA: ICD-10-CM

## 2018-11-27 PROCEDURE — 11901 INJECT SKIN LESIONS >7: CPT | Performed by: DERMATOLOGY

## 2018-11-27 PROCEDURE — 99212 OFFICE O/P EST SF 10 MIN: CPT | Mod: 25 | Performed by: DERMATOLOGY

## 2018-11-27 ASSESSMENT — ENCOUNTER SYMPTOMS
CHILLS: 0
FEVER: 0

## 2018-11-28 NOTE — PROGRESS NOTES
Dermatology Return Patient Visit    Chief Complaint   Patient presents with   • Follow-Up       Subjective:     HPI:   Navarro Clark is a 22 y.o. male presenting for    Follow-up, alopecia areata  Pt feels there has been some improvement   Pt is using the Rogaine daily, no other topicals/steroids    Pt has had this issue in the past (years ago), tx'd with kenalog shot with improvement.   No heat/cold intolerance, significant weight changes, fatigue, hair changes, additional skin problems       Other   Pertinent negatives include no chills, fever or rash.       Past Medical History:   Diagnosis Date   • Allergy    • Fracture clavicle-closed        Current Outpatient Prescriptions on File Prior to Visit   Medication Sig Dispense Refill   • aspirin (ASA) 325 MG Tab Take 325 mg by mouth every 6 hours as needed.     • fluocinonide (LIDEX) 0.05 % gel Apply 1 Application to affected area(s) every bedtime. 60 g 2   • MethylPREDNISolone (MEDROL DOSEPAK) 4 MG Tablet Therapy Pack Take as directed 1 Kit 0   • Multiple Vitamins-Minerals (MULTIVITAMIN ADULT PO) Take  by mouth.     • WHEY PROTEIN PO Take  by mouth.     • Omega-3 Fatty Acids (FISH OIL) 1000 MG Cap capsule Take 1,000 mg by mouth 3 times a day, with meals.     • benzonatate (TESSALON) 100 MG Cap Take 1 Cap by mouth 3 times a day as needed for Cough. 30 Cap 0     No current facility-administered medications on file prior to visit.        Allergies   Allergen Reactions   • Nkda [No Known Drug Allergy]        Family History   Problem Relation Age of Onset   • Diabetes Brother    • Cancer Maternal Grandfather         lung   • Stroke Paternal Grandmother    • Stroke Paternal Grandfather        Social History     Social History   • Marital status: Single     Spouse name: N/A   • Number of children: N/A   • Years of education: N/A     Occupational History   • Not on file.     Social History Main Topics   • Smoking status: Never Smoker   • Smokeless tobacco: Never Used   •  Alcohol use No   • Drug use: No   • Sexual activity: No     Other Topics Concern   • Not on file     Social History Narrative   • No narrative on file       Review of Systems   Constitutional: Negative for chills and fever.   Skin: Negative for itching and rash.   All other systems reviewed and are negative.       Objective:     A focused cutaneous exam was completed including: scalp, hair, ears, face, eyelids, conjunctiva, lips, neck with the following pertinent findings listed below. Remaining above-listed examined areas within normal limits / negative for rashes or lesions.    There were no vitals taken for this visit.    Physical Exam   Constitutional: He is oriented to person, place, and time and well-developed, well-nourished, and in no distress.   HENT:   Head: Normocephalic and atraumatic.       Right Ear: External ear normal.   Left Ear: External ear normal.   Nose: Nose normal.   Eyes: Conjunctivae are normal.   Neck: Normal range of motion.   Pulmonary/Chest: Effort normal.   Neurological: He is alert and oriented to person, place, and time.   Skin: Skin is warm and dry.   Psychiatric: Mood and affect normal.       DATA: none applicable to review    Assessment and Plan:     1. Alopecia areata - improved  - discussed continued management options, and expectations of treatment  INTRALESIONAL KENALOG:  Discussed risks and benefits of intralesional kenalog injections, including skin atrophy, discoloration, minimal risk of infection. Patient verbally agreed. ILK 2.5mg/cc x 0.5cc injected into the areas on the occipital scalp (4 injections in 2 different sites - 8 total). Patient tolerated procedure well, no complications. Aftercare discussed.   - cont Rogaine (minoxidil) 5% (foam or solution) to the area of the scalp daily. Instructions given on how to apply, s/e hypertrichosis discussed    Followup: No Follow-up on file.    Monisha Saunders M.D.

## 2019-01-02 ENCOUNTER — OFFICE VISIT (OUTPATIENT)
Dept: DERMATOLOGY | Facility: IMAGING CENTER | Age: 24
End: 2019-01-02
Payer: COMMERCIAL

## 2019-01-02 DIAGNOSIS — L63.9 ALOPECIA AREATA: ICD-10-CM

## 2019-01-02 PROCEDURE — 11900 INJECT SKIN LESIONS </W 7: CPT | Performed by: DERMATOLOGY

## 2019-01-02 ASSESSMENT — ENCOUNTER SYMPTOMS
CHILLS: 0
FEVER: 0

## 2019-01-02 NOTE — PROGRESS NOTES
Dermatology Return Patient Visit    Chief Complaint   Patient presents with   • Alopecia       Subjective:     HPI:   Navarro Clark is a 22 y.o. male presenting for    Follow-up, alopecia areata  Has one small area w/o any hair growth over right occipital scalp  Has been receiving kenalog - 2.5 and 5mg/cc dosing at prior visits  Pt is using the Rogaine daily, no other topicals/steroids    Pt has had this issue in the past (years ago), tx'd with kenalog shot with improvement.   No heat/cold intolerance, significant weight changes, fatigue, hair changes, additional skin problems       Other   Pertinent negatives include no chills, fever or rash.       Past Medical History:   Diagnosis Date   • Allergy    • Fracture clavicle-closed        Current Outpatient Prescriptions on File Prior to Visit   Medication Sig Dispense Refill   • aspirin (ASA) 325 MG Tab Take 325 mg by mouth every 6 hours as needed.     • fluocinonide (LIDEX) 0.05 % gel Apply 1 Application to affected area(s) every bedtime. 60 g 2   • MethylPREDNISolone (MEDROL DOSEPAK) 4 MG Tablet Therapy Pack Take as directed 1 Kit 0   • Multiple Vitamins-Minerals (MULTIVITAMIN ADULT PO) Take  by mouth.     • WHEY PROTEIN PO Take  by mouth.     • Omega-3 Fatty Acids (FISH OIL) 1000 MG Cap capsule Take 1,000 mg by mouth 3 times a day, with meals.     • benzonatate (TESSALON) 100 MG Cap Take 1 Cap by mouth 3 times a day as needed for Cough. 30 Cap 0     No current facility-administered medications on file prior to visit.        Allergies   Allergen Reactions   • Nkda [No Known Drug Allergy]        Family History   Problem Relation Age of Onset   • Diabetes Brother    • Cancer Maternal Grandfather         lung   • Stroke Paternal Grandmother    • Stroke Paternal Grandfather        Social History     Social History   • Marital status: Single     Spouse name: N/A   • Number of children: N/A   • Years of education: N/A     Occupational History   • Not on file.     Social  History Main Topics   • Smoking status: Never Smoker   • Smokeless tobacco: Never Used   • Alcohol use No   • Drug use: No   • Sexual activity: No     Other Topics Concern   • Not on file     Social History Narrative   • No narrative on file       Review of Systems   Constitutional: Negative for chills and fever.   Skin: Negative for itching and rash.   All other systems reviewed and are negative.       Objective:     A focused cutaneous exam was completed including: scalp, hair, ears, face, eyelids, conjunctiva, lips, neck with the following pertinent findings listed below. Remaining above-listed examined areas within normal limits / negative for rashes or lesions.    There were no vitals taken for this visit.    Physical Exam   Constitutional: He is oriented to person, place, and time and well-developed, well-nourished, and in no distress.   HENT:   Head: Normocephalic and atraumatic.       Right Ear: External ear normal.   Left Ear: External ear normal.   Nose: Nose normal.   Eyes: Conjunctivae are normal.   Neck: Normal range of motion.   Pulmonary/Chest: Effort normal.   Neurological: He is alert and oriented to person, place, and time.   Skin: Skin is warm and dry.   Psychiatric: Mood and affect normal.       DATA: none applicable to review    Assessment and Plan:     1. Alopecia areata - improved, small residual area  - discussed continued management options, and expectations of treatment  INTRALESIONAL KENALOG:  Discussed risks and benefits of intralesional kenalog injections, including skin atrophy, discoloration, minimal risk of infection. Patient verbally agreed. ILK 2.5mg/cc x 0.2cc injected into the area on the occipital scalp, right (1 injection). Patient tolerated procedure well, no complications. Aftercare discussed.   - cont Rogaine (minoxidil) 5% (foam or solution) to the area of the scalp daily. Instructions given on how to apply, s/e hypertrichosis discussed    Followup: Return if symptoms worsen  or fail to improve.    Monisha Saunders M.D.

## 2019-05-29 ENCOUNTER — OFFICE VISIT (OUTPATIENT)
Dept: MEDICAL GROUP | Facility: PHYSICIAN GROUP | Age: 24
End: 2019-05-29
Payer: COMMERCIAL

## 2019-05-29 VITALS
OXYGEN SATURATION: 95 % | BODY MASS INDEX: 25.34 KG/M2 | TEMPERATURE: 97.7 F | HEART RATE: 65 BPM | WEIGHT: 181 LBS | HEIGHT: 71 IN | DIASTOLIC BLOOD PRESSURE: 70 MMHG | SYSTOLIC BLOOD PRESSURE: 110 MMHG | RESPIRATION RATE: 16 BRPM

## 2019-05-29 DIAGNOSIS — N50.89 SCROTAL MASSES: ICD-10-CM

## 2019-05-29 PROCEDURE — 99212 OFFICE O/P EST SF 10 MIN: CPT | Performed by: FAMILY MEDICINE

## 2019-05-29 ASSESSMENT — PATIENT HEALTH QUESTIONNAIRE - PHQ9: CLINICAL INTERPRETATION OF PHQ2 SCORE: 0

## 2019-05-29 NOTE — PATIENT INSTRUCTIONS
Patient given written instructions regarding  imaging, dietary and lifestyle management, and return visit.    Guy Wren MD

## 2019-05-29 NOTE — PROGRESS NOTES
Patient comes in and is concerned that he may have developed another spermatocele.  He had one several years ago that was diagnosed by ultrasound at [facility].  He was given no treatment.  He did not hurt but it has gotten smaller.  The one that he has now is not as big as the prior one.  He has no dysuria or urgency.  He broke up with his steady girlfriend recently and is not sexually active right now.  He is not worried about exposure to any STDs.  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  He has no chest pains no shortness of breath his bowel functions are good.    I reviewed the following    Past Medical History:   Diagnosis Date   • Allergy    • Fracture clavicle-closed         History reviewed. No pertinent surgical history.    Allergies   Allergen Reactions   • Nkda [No Known Drug Allergy]        Current Outpatient Prescriptions   Medication Sig Dispense Refill   • Multiple Vitamins-Minerals (MULTIVITAMIN ADULT PO) Take  by mouth.     • WHEY PROTEIN PO Take  by mouth.     • Omega-3 Fatty Acids (FISH OIL) 1000 MG Cap capsule Take 1,000 mg by mouth 3 times a day, with meals.     • aspirin (ASA) 325 MG Tab Take 325 mg by mouth every 6 hours as needed.     • fluocinonide (LIDEX) 0.05 % gel Apply 1 Application to affected area(s) every bedtime. (Patient not taking: Reported on 5/29/2019) 60 g 2   • MethylPREDNISolone (MEDROL DOSEPAK) 4 MG Tablet Therapy Pack Take as directed (Patient not taking: Reported on 5/29/2019) 1 Kit 0   • benzonatate (TESSALON) 100 MG Cap Take 1 Cap by mouth 3 times a day as needed for Cough. (Patient not taking: Reported on 5/29/2019) 30 Cap 0     No current facility-administered medications for this visit.         Family History   Problem Relation Age of Onset   • Diabetes Brother    • Cancer Maternal Grandfather         lung   • Stroke Paternal Grandmother    • Stroke Paternal Grandfather        Social  History     Social History   • Marital status: Single     Spouse name: N/A   • Number of children: N/A   • Years of education: N/A     Occupational History   • Not on file.     Social History Main Topics   • Smoking status: Never Smoker   • Smokeless tobacco: Never Used   • Alcohol use No   • Drug use: No   • Sexual activity: Not Currently     Partners: Female     Other Topics Concern   • Not on file     Social History Narrative   • No narrative on file        Physical Exam   Nursing note and vitals reviewed.  Constitutional: He is oriented. He appears well-developed and well-nourished. He appears not diaphoretic. No distress.   Head: Normocephalic and atraumatic.    Hernia confirmed negative in the right inguinal area and confirmed negative in the left inguinal area.   Genitourinary: Penis normal.  Right testis shows no mass, no swelling, no tenderness. Right testis is descended. Left testis shows no mass, no swelling, no tenderness.  In the left scrotum there are 2 lumps one approximately 1 cm in diameter the other one approximately 1/2 cm in diameter.  They are soft and nontender.  Left testis is descended. Circumcised. No phimosis, penile erythema or penile tenderness. No discharge found.   Musculoskeletal: Normal range of motion. He exhibits no edema and no tenderness.   Lymphadenopathy:    Right: No inguinal adenopathy present.        Left: No inguinal adenopathy present.   Neurological: He is alert and oriented.  Coordination normal.   Skin: Skin is warm and dry. No rash noted. He is not diaphoretic. No erythema. No pallor.   Psychiatric: He has a normal mood and appropriate affect. His behavior is normal. Judgment and thought content normal.    1. Scrotal masses  [id number]--he will have this done at [facility] so the radiologist can compare his prior ultrasound with this present ultrasound.  Since he does not really have pain with these lumps,  which I feel are probably both  spermatoceles, I think we can just follow these along and he does not need to have them surgically excised.     Recheck with me as needed    Please note that this dictation was created using voice recognition software. I have worked with consultants from the vendor as well as technical experts from [facility] to optimize the interface. I have made every reasonable attempt to correct obvious errors, but I expect that there are errors of grammar and possibly content that I did not discover before finalizing the note.

## 2019-06-03 ENCOUNTER — EH NON-PROVIDER (OUTPATIENT)
Dept: OCCUPATIONAL MEDICINE | Facility: CLINIC | Age: 24
End: 2019-06-03

## 2019-06-03 ENCOUNTER — NON-PROVIDER VISIT (OUTPATIENT)
Dept: OCCUPATIONAL MEDICINE | Facility: CLINIC | Age: 24
End: 2019-06-03

## 2019-06-03 DIAGNOSIS — Z01.89 RESPIRATORY CLEARANCE EXAMINATION, ENCOUNTER FOR: ICD-10-CM

## 2019-06-03 DIAGNOSIS — Z02.89 ENCOUNTER FOR OCCUPATIONAL HEALTH EXAMINATION INVOLVING RESPIRATOR: ICD-10-CM

## 2019-06-03 PROCEDURE — 94375 RESPIRATORY FLOW VOLUME LOOP: CPT | Performed by: PREVENTIVE MEDICINE

## 2019-06-03 NOTE — NON-PROVIDER
Patient was seen today for their [employer] Mask Fit. My did the fitting. Charges and scanning done by kisha

## 2019-06-04 PROBLEM — N43.3 HYDROCELE, LEFT: Status: ACTIVE | Noted: 2019-06-04

## 2019-06-04 PROBLEM — N43.42 SPERMATOCELE OF EPIDIDYMIS, MULTIPLE: Status: ACTIVE | Noted: 2019-06-04

## 2019-06-04 PROBLEM — I86.1 LEFT VARICOCELE: Status: ACTIVE | Noted: 2019-06-04

## 2019-07-20 ENCOUNTER — OFFICE VISIT (OUTPATIENT)
Dept: URGENT CARE | Facility: PHYSICIAN GROUP | Age: 24
End: 2019-07-20
Payer: COMMERCIAL

## 2019-07-20 VITALS
HEART RATE: 63 BPM | SYSTOLIC BLOOD PRESSURE: 102 MMHG | HEIGHT: 71 IN | WEIGHT: 175 LBS | TEMPERATURE: 98.4 F | RESPIRATION RATE: 16 BRPM | OXYGEN SATURATION: 95 % | DIASTOLIC BLOOD PRESSURE: 74 MMHG | BODY MASS INDEX: 24.5 KG/M2

## 2019-07-20 DIAGNOSIS — J06.9 VIRAL URI: ICD-10-CM

## 2019-07-20 DIAGNOSIS — J02.9 PHARYNGITIS, UNSPECIFIED ETIOLOGY: ICD-10-CM

## 2019-07-20 LAB
INT CON NEG: NORMAL
INT CON POS: NORMAL
S PYO AG THROAT QL: NEGATIVE

## 2019-07-20 PROCEDURE — 99214 OFFICE O/P EST MOD 30 MIN: CPT | Performed by: PHYSICIAN ASSISTANT

## 2019-07-20 PROCEDURE — 87880 STREP A ASSAY W/OPTIC: CPT | Performed by: PHYSICIAN ASSISTANT

## 2019-07-20 RX ORDER — PREDNISONE 20 MG/1
20 TABLET ORAL DAILY
Qty: 3 TAB | Refills: 0 | Status: SHIPPED | OUTPATIENT
Start: 2019-07-20 | End: 2019-07-23

## 2019-07-20 RX ORDER — TRIAMCINOLONE ACETONIDE 55 UG/1
2 SPRAY, METERED NASAL DAILY
Qty: 1 BOTTLE | Refills: 0 | Status: SHIPPED | OUTPATIENT
Start: 2019-07-20 | End: 2019-12-23

## 2019-07-20 RX ORDER — GUAIFENESIN 600 MG/1
600 TABLET, EXTENDED RELEASE ORAL EVERY 12 HOURS
Qty: 28 TAB | Refills: 0 | Status: SHIPPED | OUTPATIENT
Start: 2019-07-20 | End: 2019-12-23

## 2019-07-20 ASSESSMENT — ENCOUNTER SYMPTOMS
TROUBLE SWALLOWING: 0
HOARSE VOICE: 1
ABDOMINAL PAIN: 0
SWOLLEN GLANDS: 0
VOMITING: 0
COUGH: 0

## 2019-07-20 NOTE — PROGRESS NOTES
Subjective:   Navarro Clark is a 23 y.o. male who presents for Sore Throat (C/o sore throat, headache, sinus congestion, post nasal drip x3 day. Possible fever)        Pharyngitis    This is a new problem. The problem has been rapidly worsening. Neither side of throat is experiencing more pain than the other. Maximum temperature: subjectively. The pain is moderate. Associated symptoms include congestion and a hoarse voice. Pertinent negatives include no abdominal pain, coughing, ear discharge, ear pain, plugged ear sensation, swollen glands, trouble swallowing or vomiting. Associated symptoms comments: Pain with swallowing. He has had no exposure to strep or mono. Treatments tried: emergenC. The treatment provided no relief.     Review of Systems   HENT: Positive for congestion and hoarse voice. Negative for ear discharge, ear pain and trouble swallowing.    Respiratory: Negative for cough.    Gastrointestinal: Negative for abdominal pain and vomiting.       PMH:  has a past medical history of Allergy and Fracture clavicle-closed. He also has no past medical history of ASTHMA; CAD (coronary artery disease); Cancer ([facility]); Congestive heart failure (HCC); COPD; Diabetes; Hypertension; Infectious disease; Liver disease; Psychiatric disorder; Renal disorder; Seizure disorder ([facility]); or Stroke ([facility]).  MEDS:   Current Outpatient Prescriptions:   •  predniSONE (DELTASONE) 20 MG Tab, Take 1 Tab by mouth every day for 3 days., Disp: 3 Tab, Rfl: 0  •  triamcinolone (NASACORT) 55 MCG/ACT nasal inhaler, Spray 2 Sprays in nose every day., Disp: 1 Bottle, Rfl: 0  •  guaiFENesin LA (MUCINEX) 600 MG TABLET SR 12 HR, Take 1 Tab by mouth every 12 hours., Disp: 28 Tab, Rfl: 0  •  Multiple Vitamins-Minerals (MULTIVITAMIN ADULT PO), Take  by mouth., Disp: , Rfl:   •  WHEY PROTEIN PO, Take  by mouth., Disp: , Rfl:   •  Omega-3 Fatty Acids (FISH OIL) 1000 MG Cap capsule, Take 1,000 mg by mouth 3 times a day, with meals., Disp: , Rfl:   •   "aspirin (ASA) 325 MG Tab, Take 325 mg by mouth every 6 hours as needed., Disp: , Rfl:   •  fluocinonide (LIDEX) 0.05 % gel, Apply 1 Application to affected area(s) every bedtime. (Patient not taking: Reported on 5/29/2019), Disp: 60 g, Rfl: 2  •  benzonatate (TESSALON) 100 MG Cap, Take 1 Cap by mouth 3 times a day as needed for Cough. (Patient not taking: Reported on 5/29/2019), Disp: 30 Cap, Rfl: 0  ALLERGIES:   Allergies   Allergen Reactions   • Nkda [No Known Drug Allergy]      SURGHX: No past surgical history on file.  SOCHX:  reports that he has never smoked. He has never used smokeless tobacco. He reports that he does not drink alcohol or use drugs.  FH: Family history was reviewed, no pertinent findings to report   Objective:   /74 (BP Location: Left arm, Patient Position: Sitting, BP Cuff Size: Adult)   Pulse 63   Temp 36.9 °C (98.4 °F) (Temporal)   Resp 16   Ht 1.803 m (5' 11\")   Wt 79.4 kg (175 lb)   SpO2 95%   BMI 24.41 kg/m²   Physical Exam   Constitutional: He is oriented to person, place, and time. He appears well-developed and well-nourished.  Non-toxic appearance. No distress.   HENT:   Head: Normocephalic and atraumatic.   Right Ear: Hearing, tympanic membrane, external ear and ear canal normal.   Left Ear: Hearing, external ear and ear canal normal. Tympanic membrane is bulging.   Nose: Mucosal edema and rhinorrhea present. Right sinus exhibits maxillary sinus tenderness. Right sinus exhibits no frontal sinus tenderness. Left sinus exhibits maxillary sinus tenderness. Left sinus exhibits no frontal sinus tenderness.   Mouth/Throat: Uvula is midline and mucous membranes are normal. Posterior oropharyngeal erythema present. No tonsillar exudate.   Eyes: Conjunctivae and lids are normal.   Neck: Neck supple.   Cardiovascular: Normal rate and regular rhythm.    Pulmonary/Chest: Effort normal and breath sounds normal. No respiratory distress.   Abdominal: Normal appearance. "   Musculoskeletal:   Normal range of motion. Exhibits no edema and no tenderness.    Lymphadenopathy:        Right cervical: No superficial cervical and no posterior cervical adenopathy present.       Left cervical: No superficial cervical and no posterior cervical adenopathy present.   Neurological: He is alert and oriented to person, place, and time. No cranial nerve deficit or sensory deficit.   Skin: Skin is warm, dry and intact. Capillary refill takes less than 2 seconds.   Psychiatric: He has a normal mood and affect. His speech is normal and behavior is normal. Judgment and thought content normal. Cognition and memory are normal.   Vitals reviewed.        Assessment/Plan:   1. Viral URI  - triamcinolone (NASACORT) 55 MCG/ACT nasal inhaler; Spray 2 Sprays in nose every day.  Dispense: 1 Bottle; Refill: 0  - guaiFENesin LA (MUCINEX) 600 MG TABLET SR 12 HR; Take 1 Tab by mouth every 12 hours.  Dispense: 28 Tab; Refill: 0    2. Pharyngitis, unspecified etiology  - predniSONE (DELTASONE) 20 MG Tab; Take 1 Tab by mouth every day for 3 days.  Dispense: 3 Tab; Refill: 0    VSS, no dyspnea, no SOB, and lungs CTA on PE.  Consistent with viral URI without lower respiratory involvement. Goals of care include symptomatic control and prevention of lower respiratory spread. Signs of lower respiratory involvement discussed with pt.  Pt instructed to RTC if any of these are observed.     Drink plenty of fluids and rest.  Flonase 1 squirt in each nostril, as instructed in clinic, once a day.  Use nasal saline TID to promote drainage.   Salt water gurgles to soothe sore throat.  Start OTC expectorant.  APAP for fever control, and ibuprofen for throat pain/headache relief prn.    Try to use sudafed sparingly in order to allow sinuses to drain.  Avoid the longer formulations and try to take only in the morning and/or at noon if needed.  If you fail to improve in 2-4 days or symptoms worsen/new symptoms develop, RTC for  reevaluation.    Differential diagnosis, natural history, supportive care, and indications for immediate follow-up discussed.

## 2019-07-22 ENCOUNTER — OFFICE VISIT (OUTPATIENT)
Dept: URGENT CARE | Facility: CLINIC | Age: 24
End: 2019-07-22
Payer: COMMERCIAL

## 2019-07-22 VITALS
SYSTOLIC BLOOD PRESSURE: 116 MMHG | RESPIRATION RATE: 16 BRPM | WEIGHT: 175 LBS | HEIGHT: 71 IN | HEART RATE: 68 BPM | DIASTOLIC BLOOD PRESSURE: 78 MMHG | BODY MASS INDEX: 24.5 KG/M2 | OXYGEN SATURATION: 98 % | TEMPERATURE: 98.2 F

## 2019-07-22 DIAGNOSIS — J06.9 VIRAL URI WITH COUGH: ICD-10-CM

## 2019-07-22 PROCEDURE — 99213 OFFICE O/P EST LOW 20 MIN: CPT | Performed by: PHYSICIAN ASSISTANT

## 2019-07-22 ASSESSMENT — ENCOUNTER SYMPTOMS
PALPITATIONS: 0
FEVER: 0
GASTROINTESTINAL NEGATIVE: 1
CHILLS: 0
RHINORRHEA: 1
SPUTUM PRODUCTION: 1
HEADACHES: 1
DIZZINESS: 0
WHEEZING: 0
SINUS PAIN: 0
SHORTNESS OF BREATH: 0
MYALGIAS: 0
SORE THROAT: 1
COUGH: 1

## 2019-07-23 NOTE — PROGRESS NOTES
Subjective:      Navarro Clark is a 23 y.o. male who presents with Sinus Problem (x 4 days, nasal congestion, stuffy and runny nose, headaches) and Cough (x last night, productive cough, chest congestion)            Cough   This is a new problem. The current episode started in the past 7 days. The problem has been unchanged. The problem occurs every few minutes. The cough is productive of sputum. Associated symptoms include headaches, nasal congestion, rhinorrhea and a sore throat. Pertinent negatives include no chest pain, chills, ear pain, fever, myalgias, shortness of breath or wheezing. He has tried OTC cough suppressant and oral steroids (Nasal spray) for the symptoms. The treatment provided mild relief. There is no history of asthma, bronchitis or pneumonia.       PMH:  has a past medical history of Allergy and Fracture clavicle-closed. He also has no past medical history of ASTHMA; CAD (coronary artery disease); Cancer ([facility]); Congestive heart failure ([facility]); COPD; Diabetes; Hypertension; Infectious disease; Liver disease; Psychiatric disorder; Renal disorder; Seizure disorder ([facility]); or Stroke ([facility]).  MEDS:   Current Outpatient Prescriptions:   •  predniSONE (DELTASONE) 20 MG Tab, Take 1 Tab by mouth every day for 3 days., Disp: 3 Tab, Rfl: 0  •  triamcinolone (NASACORT) 55 MCG/ACT nasal inhaler, Spray 2 Sprays in nose every day., Disp: 1 Bottle, Rfl: 0  •  guaiFENesin LA (MUCINEX) 600 MG TABLET SR 12 HR, Take 1 Tab by mouth every 12 hours., Disp: 28 Tab, Rfl: 0  •  aspirin (ASA) 325 MG Tab, Take 325 mg by mouth every 6 hours as needed., Disp: , Rfl:   •  Multiple Vitamins-Minerals (MULTIVITAMIN ADULT PO), Take  by mouth., Disp: , Rfl:   •  Omega-3 Fatty Acids (FISH OIL) 1000 MG Cap capsule, Take 1,000 mg by mouth 3 times a day, with meals., Disp: , Rfl:   •  fluocinonide (LIDEX) 0.05 % gel, Apply 1 Application to affected area(s) every bedtime. (Patient not taking: Reported on 5/29/2019), Disp: 60 g, Rfl:  "2  •  WHEY PROTEIN PO, Take  by mouth., Disp: , Rfl:   •  benzonatate (TESSALON) 100 MG Cap, Take 1 Cap by mouth 3 times a day as needed for Cough. (Patient not taking: Reported on 5/29/2019), Disp: 30 Cap, Rfl: 0  ALLERGIES:   Allergies   Allergen Reactions   • Nkda [No Known Drug Allergy]      SURGHX: No past surgical history on file.  SOCHX:  reports that he has never smoked. He has never used smokeless tobacco. He reports that he does not drink alcohol or use drugs.  FH: family history includes Cancer in his maternal grandfather; Diabetes in his brother; Stroke in his paternal grandfather and paternal grandmother.    Review of Systems   Constitutional: Negative for chills and fever.   HENT: Positive for congestion, rhinorrhea and sore throat. Negative for ear pain and sinus pain.    Respiratory: Positive for cough and sputum production. Negative for shortness of breath and wheezing.    Cardiovascular: Negative for chest pain, palpitations and leg swelling.   Gastrointestinal: Negative.    Musculoskeletal: Negative for joint pain and myalgias.   Neurological: Positive for headaches. Negative for dizziness.       Medications, Allergies, and current problem list reviewed today in Epic     Objective:     /78 (BP Location: Right arm, Patient Position: Sitting, BP Cuff Size: Adult)   Pulse 68   Temp 36.8 °C (98.2 °F) (Temporal)   Resp 16   Ht 1.803 m (5' 11\")   Wt 79.4 kg (175 lb)   SpO2 98%   BMI 24.41 kg/m²      Physical Exam   Constitutional: He is oriented to person, place, and time. He appears well-developed and well-nourished. No distress.   HENT:   Head: Normocephalic and atraumatic.   Right Ear: Tympanic membrane and external ear normal.   Left Ear: Tympanic membrane and external ear normal.   Nose: Mucosal edema and rhinorrhea present.   Mouth/Throat: Oropharynx is clear and moist. No oropharyngeal exudate.   Eyes: Pupils are equal, round, and reactive to light. Conjunctivae and EOM are normal. " Right eye exhibits no discharge. Left eye exhibits no discharge.   Neck: Normal range of motion. Neck supple.   Cardiovascular: Normal rate, regular rhythm and normal heart sounds.    No murmur heard.  Pulmonary/Chest: Effort normal and breath sounds normal. No respiratory distress. He has no decreased breath sounds. He has no wheezes. He has no rhonchi. He has no rales. He exhibits no tenderness.   Lymphadenopathy:     He has no cervical adenopathy.   Neurological: He is alert and oriented to person, place, and time.   Skin: Skin is warm and dry. He is not diaphoretic.   Psychiatric: He has a normal mood and affect. His behavior is normal. Judgment and thought content normal.   Nursing note and vitals reviewed.              Assessment/Plan:     1. Viral URI with cough       PO2 adequate, lungs clear bilaterally.  Exam unremarkable.  Vital signs normal with no fever chills.  No signs of bacterial infection at this time.  No lower respiratory involvement.  Viral upper respiratory infection  OTC meds and conservative measures as discussed    Return to clinic or go to ED if symptoms worsen or persist. Indications for ED discussed at length. Patient voices understanding. Follow-up with your primary care provider in 3-5 days. Red flags discussed. All side effects of medication discussed including allergic response, GI upset, tendon injury, etc.    Please note that this dictation was created using voice recognition software. I have made every reasonable attempt to correct obvious errors, but I expect that there are errors of grammar and possibly content that I did not discover before finalizing the note.

## 2019-10-24 ENCOUNTER — OFFICE VISIT (OUTPATIENT)
Dept: DERMATOLOGY | Facility: IMAGING CENTER | Age: 24
End: 2019-10-24
Payer: COMMERCIAL

## 2019-10-24 DIAGNOSIS — L29.9 PRURITUS: ICD-10-CM

## 2019-10-24 DIAGNOSIS — L63.9 ALOPECIA AREATA: ICD-10-CM

## 2019-10-24 PROCEDURE — 11900 INJECT SKIN LESIONS </W 7: CPT | Performed by: DERMATOLOGY

## 2019-10-24 NOTE — PROGRESS NOTES
Dermatology Return Patient Visit    Chief Complaint   Patient presents with   • Follow-Up       Subjective:     HPI:   Navarro Clark is a 22 y.o. male presenting for    Follow-up, alopecia areata  Still persistent, not responding to ILK in one area (right occiput)  Left occipital scalp improving  Pt used Rogaine for 1 mth and stopped   Area can be itching     Pt has had this issue in the past (years ago), tx'd with kenalog shot with improvement.   No heat/cold intolerance, significant weight changes, fatigue, hair changes, additional skin problems       Other   Pertinent negatives include no chills, fever or rash.       Past Medical History:   Diagnosis Date   • Allergy    • Fracture clavicle-closed        Current Outpatient Medications on File Prior to Visit   Medication Sig Dispense Refill   • triamcinolone (NASACORT) 55 MCG/ACT nasal inhaler Spray 2 Sprays in nose every day. 1 Bottle 0   • guaiFENesin LA (MUCINEX) 600 MG TABLET SR 12 HR Take 1 Tab by mouth every 12 hours. 28 Tab 0   • aspirin (ASA) 325 MG Tab Take 325 mg by mouth every 6 hours as needed.     • fluocinonide (LIDEX) 0.05 % gel Apply 1 Application to affected area(s) every bedtime. (Patient not taking: Reported on 5/29/2019) 60 g 2   • Multiple Vitamins-Minerals (MULTIVITAMIN ADULT PO) Take  by mouth.     • WHEY PROTEIN PO Take  by mouth.     • Omega-3 Fatty Acids (FISH OIL) 1000 MG Cap capsule Take 1,000 mg by mouth 3 times a day, with meals.     • benzonatate (TESSALON) 100 MG Cap Take 1 Cap by mouth 3 times a day as needed for Cough. (Patient not taking: Reported on 5/29/2019) 30 Cap 0     No current facility-administered medications on file prior to visit.        Allergies   Allergen Reactions   • Nkda [No Known Drug Allergy]        Family History   Problem Relation Age of Onset   • Diabetes Brother    • Cancer Maternal Grandfather         lung   • Stroke Paternal Grandmother    • Stroke Paternal Grandfather        Social History      Socioeconomic History   • Marital status: Single     Spouse name: Not on file   • Number of children: Not on file   • Years of education: Not on file   • Highest education level: Not on file   Occupational History   • Not on file   Social Needs   • Financial resource strain: Not on file   • Food insecurity:     Worry: Not on file     Inability: Not on file   • Transportation needs:     Medical: Not on file     Non-medical: Not on file   Tobacco Use   • Smoking status: Never Smoker   • Smokeless tobacco: Never Used   Substance and Sexual Activity   • Alcohol use: No   • Drug use: No   • Sexual activity: Not Currently     Partners: Female   Lifestyle   • Physical activity:     Days per week: Not on file     Minutes per session: Not on file   • Stress: Not on file   Relationships   • Social connections:     Talks on phone: Not on file     Gets together: Not on file     Attends Voodoo service: Not on file     Active member of club or organization: Not on file     Attends meetings of clubs or organizations: Not on file     Relationship status: Not on file   • Intimate partner violence:     Fear of current or ex partner: Not on file     Emotionally abused: Not on file     Physically abused: Not on file     Forced sexual activity: Not on file   Other Topics Concern   • Not on file   Social History Narrative   • Not on file       Review of Systems   Constitutional: Negative for chills and fever.   Skin: Negative for itching and rash.   All other systems reviewed and are negative.       Objective:     A focused cutaneous exam was completed including: scalp, hair, ears, face, eyelids, conjunctiva, lips, neck with the following pertinent findings listed below. Remaining above-listed examined areas within normal limits / negative for rashes or lesions.    There were no vitals taken for this visit.    Physical Exam   Constitutional: He is oriented to person, place, and time and well-developed, well-nourished, and in no  distress.   HENT:   Head: Normocephalic and atraumatic.   Right Ear: External ear normal.   Left Ear: External ear normal.   Nose: Nose normal.   Eyes: Conjunctivae are normal.   Neck: Normal range of motion.   Pulmonary/Chest: Effort normal.   Neurological: He is alert and oriented to person, place, and time.   Skin: Skin is warm and dry.   Two 1/2 dollar sized patches of decreased hair density on the right & left occipital scalp  Psychiatric: Mood and affect normal.     DATA: none applicable to review    Assessment and Plan:     1. Alopecia areata - still with persistent areas, +mild itching  INTRALESIONAL KENALOG:  Discussed risks and benefits of intralesional kenalog injections, including skin atrophy, discoloration, minimal risk of infection. Patient verbally agreed. ILK 5mg/cc x 0.4cc injected into the areas on the occipital scalp, right & left. Patient tolerated procedure well, no complications. Aftercare discussed.   - can cont Rogaine (minoxidil) 5% (foam or solution) to the area of the scalp daily. Instructions given on how to apply, s/e hypertrichosis discussed  - discussed continued management options, and expectations of treatment; does not wish to pursue anything systemic currently, will try topical tofacitinib 2% cream BID to aa for 2-3 months to see how he responds    Followup: Return in about 4 weeks (around 11/21/2019), or if symptoms worsen or fail to improve.    Monisha Saunders M.D.

## 2019-12-16 ENCOUNTER — TELEPHONE (OUTPATIENT)
Dept: DERMATOLOGY | Facility: IMAGING CENTER | Age: 24
End: 2019-12-16

## 2019-12-16 NOTE — TELEPHONE ENCOUNTER
PREMA.  Received patient message from answering service regarding new medication side effects.    Spoke with patient who states he temporarily d/c medication tosacitinib.  Was experiencing neck/shoulder and overall joint pain while using.  He wanted to know if they could be related.

## 2019-12-17 NOTE — TELEPHONE ENCOUNTER
Patient notified of Dr. Sprague's recommendations to discontinue medication.  Patient stated that he is having hernia repair surgery next week so he will take a break to see if symptoms return.  He likes the medication and will continue using after recovery from hernia repair.

## 2019-12-23 RX ORDER — IBUPROFEN 200 MG
600 TABLET ORAL EVERY 8 HOURS PRN
COMMUNITY

## 2019-12-27 ENCOUNTER — ANESTHESIA (OUTPATIENT)
Dept: SURGERY | Facility: MEDICAL CENTER | Age: 24
End: 2019-12-27
Payer: COMMERCIAL

## 2019-12-27 ENCOUNTER — ANESTHESIA EVENT (OUTPATIENT)
Dept: SURGERY | Facility: MEDICAL CENTER | Age: 24
End: 2019-12-27
Payer: COMMERCIAL

## 2019-12-27 ENCOUNTER — HOSPITAL ENCOUNTER (OUTPATIENT)
Facility: MEDICAL CENTER | Age: 24
End: 2019-12-27
Attending: SURGERY | Admitting: SURGERY
Payer: COMMERCIAL

## 2019-12-27 VITALS
SYSTOLIC BLOOD PRESSURE: 112 MMHG | BODY MASS INDEX: 24.41 KG/M2 | RESPIRATION RATE: 17 BRPM | OXYGEN SATURATION: 100 % | HEART RATE: 55 BPM | HEIGHT: 71 IN | TEMPERATURE: 97 F | DIASTOLIC BLOOD PRESSURE: 48 MMHG | WEIGHT: 174.38 LBS

## 2019-12-27 DIAGNOSIS — G89.18 POST-OPERATIVE PAIN: ICD-10-CM

## 2019-12-27 PROCEDURE — 500868 HCHG NEEDLE, SURGI(VARES): Performed by: SURGERY

## 2019-12-27 PROCEDURE — 160025 RECOVERY II MINUTES (STATS): Performed by: SURGERY

## 2019-12-27 PROCEDURE — 700111 HCHG RX REV CODE 636 W/ 250 OVERRIDE (IP): Performed by: ANESTHESIOLOGY

## 2019-12-27 PROCEDURE — 160009 HCHG ANES TIME/MIN: Performed by: SURGERY

## 2019-12-27 PROCEDURE — 700101 HCHG RX REV CODE 250: Performed by: SURGERY

## 2019-12-27 PROCEDURE — 700102 HCHG RX REV CODE 250 W/ 637 OVERRIDE(OP): Performed by: ANESTHESIOLOGY

## 2019-12-27 PROCEDURE — 160031 HCHG SURGERY MINUTES - 1ST 30 MINS LEVEL 5: Performed by: SURGERY

## 2019-12-27 PROCEDURE — C1781 MESH (IMPLANTABLE): HCPCS | Performed by: SURGERY

## 2019-12-27 PROCEDURE — 500002 HCHG ADHESIVE, DERMABOND: Performed by: SURGERY

## 2019-12-27 PROCEDURE — 160035 HCHG PACU - 1ST 60 MINS PHASE I: Performed by: SURGERY

## 2019-12-27 PROCEDURE — 700111 HCHG RX REV CODE 636 W/ 250 OVERRIDE (IP)

## 2019-12-27 PROCEDURE — 160036 HCHG PACU - EA ADDL 30 MINS PHASE I: Performed by: SURGERY

## 2019-12-27 PROCEDURE — 160048 HCHG OR STATISTICAL LEVEL 1-5: Performed by: SURGERY

## 2019-12-27 PROCEDURE — 160046 HCHG PACU - 1ST 60 MINS PHASE II: Performed by: SURGERY

## 2019-12-27 PROCEDURE — 700105 HCHG RX REV CODE 258: Performed by: SURGERY

## 2019-12-27 PROCEDURE — A9270 NON-COVERED ITEM OR SERVICE: HCPCS | Performed by: ANESTHESIOLOGY

## 2019-12-27 PROCEDURE — 160002 HCHG RECOVERY MINUTES (STAT): Performed by: SURGERY

## 2019-12-27 PROCEDURE — 502714 HCHG ROBOTIC SURGERY SERVICES: Performed by: SURGERY

## 2019-12-27 PROCEDURE — 501838 HCHG SUTURE GENERAL: Performed by: SURGERY

## 2019-12-27 PROCEDURE — 160042 HCHG SURGERY MINUTES - EA ADDL 1 MIN LEVEL 5: Performed by: SURGERY

## 2019-12-27 PROCEDURE — 160047 HCHG PACU  - EA ADDL 30 MINS PHASE II: Performed by: SURGERY

## 2019-12-27 PROCEDURE — 700101 HCHG RX REV CODE 250: Performed by: ANESTHESIOLOGY

## 2019-12-27 DEVICE — MESH PROGRIP LAPROSCOPIC SELF FIXATING (1/CA): Type: IMPLANTABLE DEVICE | Status: FUNCTIONAL

## 2019-12-27 RX ORDER — CEFAZOLIN SODIUM 1 G/3ML
INJECTION, POWDER, FOR SOLUTION INTRAMUSCULAR; INTRAVENOUS PRN
Status: DISCONTINUED | OUTPATIENT
Start: 2019-12-27 | End: 2019-12-27 | Stop reason: SURG

## 2019-12-27 RX ORDER — HYDROMORPHONE HYDROCHLORIDE 1 MG/ML
0.2 INJECTION, SOLUTION INTRAMUSCULAR; INTRAVENOUS; SUBCUTANEOUS
Status: DISCONTINUED | OUTPATIENT
Start: 2019-12-27 | End: 2019-12-27 | Stop reason: HOSPADM

## 2019-12-27 RX ORDER — OXYCODONE HYDROCHLORIDE 5 MG/1
5 TABLET ORAL EVERY 4 HOURS PRN
Qty: 15 TAB | Refills: 0 | Status: SHIPPED | OUTPATIENT
Start: 2019-12-27 | End: 2019-12-31

## 2019-12-27 RX ORDER — MEPERIDINE HYDROCHLORIDE 25 MG/ML
12.5 INJECTION INTRAMUSCULAR; INTRAVENOUS; SUBCUTANEOUS
Status: DISCONTINUED | OUTPATIENT
Start: 2019-12-27 | End: 2019-12-27 | Stop reason: HOSPADM

## 2019-12-27 RX ORDER — OXYCODONE HCL 5 MG/5 ML
10 SOLUTION, ORAL ORAL
Status: COMPLETED | OUTPATIENT
Start: 2019-12-27 | End: 2019-12-27

## 2019-12-27 RX ORDER — SODIUM CHLORIDE, SODIUM LACTATE, POTASSIUM CHLORIDE, CALCIUM CHLORIDE 600; 310; 30; 20 MG/100ML; MG/100ML; MG/100ML; MG/100ML
INJECTION, SOLUTION INTRAVENOUS CONTINUOUS
Status: DISCONTINUED | OUTPATIENT
Start: 2019-12-27 | End: 2019-12-27 | Stop reason: HOSPADM

## 2019-12-27 RX ORDER — HALOPERIDOL 5 MG/ML
1 INJECTION INTRAMUSCULAR
Status: DISCONTINUED | OUTPATIENT
Start: 2019-12-27 | End: 2019-12-27 | Stop reason: HOSPADM

## 2019-12-27 RX ORDER — OXYCODONE HCL 5 MG/5 ML
5 SOLUTION, ORAL ORAL
Status: COMPLETED | OUTPATIENT
Start: 2019-12-27 | End: 2019-12-27

## 2019-12-27 RX ORDER — DIPHENHYDRAMINE HYDROCHLORIDE 50 MG/ML
12.5 INJECTION INTRAMUSCULAR; INTRAVENOUS
Status: DISCONTINUED | OUTPATIENT
Start: 2019-12-27 | End: 2019-12-27 | Stop reason: HOSPADM

## 2019-12-27 RX ORDER — HYDROMORPHONE HYDROCHLORIDE 1 MG/ML
0.1 INJECTION, SOLUTION INTRAMUSCULAR; INTRAVENOUS; SUBCUTANEOUS
Status: DISCONTINUED | OUTPATIENT
Start: 2019-12-27 | End: 2019-12-27 | Stop reason: HOSPADM

## 2019-12-27 RX ORDER — ONDANSETRON 2 MG/ML
4 INJECTION INTRAMUSCULAR; INTRAVENOUS
Status: COMPLETED | OUTPATIENT
Start: 2019-12-27 | End: 2019-12-27

## 2019-12-27 RX ORDER — HYDROMORPHONE HYDROCHLORIDE 1 MG/ML
0.4 INJECTION, SOLUTION INTRAMUSCULAR; INTRAVENOUS; SUBCUTANEOUS
Status: DISCONTINUED | OUTPATIENT
Start: 2019-12-27 | End: 2019-12-27 | Stop reason: HOSPADM

## 2019-12-27 RX ORDER — BUPIVACAINE HYDROCHLORIDE AND EPINEPHRINE 5; 5 MG/ML; UG/ML
INJECTION, SOLUTION EPIDURAL; INTRACAUDAL; PERINEURAL
Status: DISCONTINUED | OUTPATIENT
Start: 2019-12-27 | End: 2019-12-27 | Stop reason: HOSPADM

## 2019-12-27 RX ORDER — LIDOCAINE HYDROCHLORIDE 10 MG/ML
INJECTION, SOLUTION EPIDURAL; INFILTRATION; INTRACAUDAL; PERINEURAL
Status: COMPLETED
Start: 2019-12-27 | End: 2019-12-27

## 2019-12-27 RX ORDER — OXYCODONE HCL 5 MG/5 ML
SOLUTION, ORAL ORAL
Status: DISCONTINUED
Start: 2019-12-27 | End: 2019-12-27 | Stop reason: HOSPADM

## 2019-12-27 RX ADMIN — FENTANYL CITRATE 50 MCG: 0.05 INJECTION, SOLUTION INTRAMUSCULAR; INTRAVENOUS at 14:12

## 2019-12-27 RX ADMIN — Medication 0.5 ML: at 10:53

## 2019-12-27 RX ADMIN — FENTANYL CITRATE 25 MCG: 50 INJECTION, SOLUTION INTRAMUSCULAR; INTRAVENOUS at 13:03

## 2019-12-27 RX ADMIN — CEFAZOLIN 2 G: 330 INJECTION, POWDER, FOR SOLUTION INTRAMUSCULAR; INTRAVENOUS at 12:57

## 2019-12-27 RX ADMIN — HYDROMORPHONE HYDROCHLORIDE 0.4 MG: 1 INJECTION, SOLUTION INTRAMUSCULAR; INTRAVENOUS; SUBCUTANEOUS at 14:38

## 2019-12-27 RX ADMIN — ONDANSETRON 4 MG: 2 INJECTION INTRAMUSCULAR; INTRAVENOUS at 17:23

## 2019-12-27 RX ADMIN — HYDROMORPHONE HYDROCHLORIDE 0.2 MG: 1 INJECTION, SOLUTION INTRAMUSCULAR; INTRAVENOUS; SUBCUTANEOUS at 14:48

## 2019-12-27 RX ADMIN — FENTANYL CITRATE 50 MCG: 0.05 INJECTION, SOLUTION INTRAMUSCULAR; INTRAVENOUS at 14:06

## 2019-12-27 RX ADMIN — ROCURONIUM BROMIDE 30 MG: 10 INJECTION, SOLUTION INTRAVENOUS at 13:01

## 2019-12-27 RX ADMIN — SODIUM CHLORIDE, POTASSIUM CHLORIDE, SODIUM LACTATE AND CALCIUM CHLORIDE: 600; 310; 30; 20 INJECTION, SOLUTION INTRAVENOUS at 10:53

## 2019-12-27 RX ADMIN — PROPOFOL 170 MG: 10 INJECTION, EMULSION INTRAVENOUS at 13:01

## 2019-12-27 RX ADMIN — LIDOCAINE HYDROCHLORIDE 0.5 ML: 10 INJECTION, SOLUTION EPIDURAL; INFILTRATION; INTRACAUDAL at 10:53

## 2019-12-27 RX ADMIN — HYDROMORPHONE HYDROCHLORIDE 0.4 MG: 1 INJECTION, SOLUTION INTRAMUSCULAR; INTRAVENOUS; SUBCUTANEOUS at 14:31

## 2019-12-27 RX ADMIN — HYDROMORPHONE HYDROCHLORIDE 0.2 MG: 1 INJECTION, SOLUTION INTRAMUSCULAR; INTRAVENOUS; SUBCUTANEOUS at 14:43

## 2019-12-27 RX ADMIN — HYDROMORPHONE HYDROCHLORIDE 0.2 MG: 1 INJECTION, SOLUTION INTRAMUSCULAR; INTRAVENOUS; SUBCUTANEOUS at 14:58

## 2019-12-27 RX ADMIN — HYDROMORPHONE HYDROCHLORIDE 0.2 MG: 1 INJECTION, SOLUTION INTRAMUSCULAR; INTRAVENOUS; SUBCUTANEOUS at 15:03

## 2019-12-27 RX ADMIN — HYDROMORPHONE HYDROCHLORIDE 0.2 MG: 1 INJECTION, SOLUTION INTRAMUSCULAR; INTRAVENOUS; SUBCUTANEOUS at 14:53

## 2019-12-27 RX ADMIN — HYDROMORPHONE HYDROCHLORIDE 0.2 MG: 1 INJECTION, SOLUTION INTRAMUSCULAR; INTRAVENOUS; SUBCUTANEOUS at 15:08

## 2019-12-27 RX ADMIN — MIDAZOLAM HYDROCHLORIDE 2 MG: 1 INJECTION, SOLUTION INTRAMUSCULAR; INTRAVENOUS at 12:59

## 2019-12-27 RX ADMIN — FENTANYL CITRATE 50 MCG: 50 INJECTION, SOLUTION INTRAMUSCULAR; INTRAVENOUS at 13:16

## 2019-12-27 RX ADMIN — SODIUM CHLORIDE, POTASSIUM CHLORIDE, SODIUM LACTATE AND CALCIUM CHLORIDE: 600; 310; 30; 20 INJECTION, SOLUTION INTRAVENOUS at 12:57

## 2019-12-27 RX ADMIN — OXYCODONE HYDROCHLORIDE 10 MG: 5 SOLUTION ORAL at 14:08

## 2019-12-27 RX ADMIN — FENTANYL CITRATE 50 MCG: 50 INJECTION, SOLUTION INTRAMUSCULAR; INTRAVENOUS at 13:00

## 2019-12-27 ASSESSMENT — PAIN SCALES - GENERAL: PAIN_LEVEL: 2

## 2019-12-27 NOTE — OR NURSING
Assume care for pt in pre-op. Patient allergies and NPO status verified. Belongings secured. Patient verbalizes understanding of pain scale, expected course of stay and plan of care. Surgical site verified with patient. IV access established.  Call light within reach. No further needs at this time. Hourly rounding in place.

## 2019-12-27 NOTE — DISCHARGE INSTRUCTIONS
ACTIVITY: Rest and take it easy for the first 24 hours.  A responsible adult is recommended to remain with you during that time.  It is normal to feel sleepy.  We encourage you to not do anything that requires balance, judgment or coordination.    MILD FLU-LIKE SYMPTOMS ARE NORMAL. YOU MAY EXPERIENCE GENERALIZED MUSCLE ACHES, THROAT IRRITATION, HEADACHE AND/OR SOME NAUSEA.    FOR 24 HOURS DO NOT:  Drive, operate machinery or run household appliances.  Drink beer or alcoholic beverages.   Make important decisions or sign legal documents.    SPECIAL INSTRUCTIONS:   See pain management handout.  Follow any instructions given to you by Dr. Mcpherson.    1.   The pain medication is extremely constipating.    Take a stool softener and drink lots of water.  It also can be addicting.  Please try to transition to an over the counter pain remedy as soon as possible.     2.   Alternating ice and heat for 30 minutes can greatly reduce your pain.     3.   It's ok to shower on the day after your surgery.   Do not scrub the incisions.     4.   After laparoscopy, it's common to have shoulder pain or pain when you take a deep breath.   If the pain radiates down your arm, call or present to the ER.     5.   Please call for redness or drainage from the wound sites that persists.     6.   Feel free to call with questions at [phone].   If you have paperwork that needs filling out, please contact us at this number.     7.   Follow up with me in 1-2 weeks for your postoperative exam.     8.   Activity restrictions vary according to the surgery.   If it hurts, don't do it.   You may begin light exercise at 7-10 days.     Anastacio Mcpherson MD FACS   [facility]    DIET: To avoid nausea, slowly advance diet as tolerated, avoiding spicy or greasy foods for the first day.  Add more substantial food to your diet according to your physician's instructions. INCREASE FLUIDS AND FIBER TO AVOID CONSTIPATION.    FOLLOW-UP  APPOINTMENT:  A follow-up appointment should be arranged with your doctor in 1-2 weeks; call to schedule  Dr. Mcpherson [phone].    You should CALL YOUR PHYSICIAN if you develop:  Fever greater than 101 degrees F.  Pain not relieved by medication, or persistent nausea or vomiting.  Excessive bleeding (blood soaking through dressing) or unexpected drainage from the wound.  Extreme redness or swelling around the incision site, drainage of pus or foul smelling drainage.  Inability to urinate or empty your bladder within 8 hours.  Problems with breathing or chest pain.    You should call 911 if you develop problems with breathing or chest pain.  If you are unable to contact your doctor or surgical center, you should go to the nearest emergency room or urgent care center.  Physician's telephone #: Dr. Mcpherson [phone]    If any questions arise, call your doctor.  If your doctor is not available, please feel free to call the Surgical Center at [phone].  The Center is open Monday through Friday from 7AM to 7PM.  You can also call the [employer] HOTLINE open 24 hours/day, 7 days/week and speak to a nurse at [phone], or toll free at [phone].    A registered nurse may call you a few days after your surgery to see how you are doing after your procedure.    MEDICATIONS: Resume taking daily medication.  Take prescribed pain medication with food.  If no medication is prescribed, you may take non-aspirin pain medication if needed.  PAIN MEDICATION CAN BE VERY CONSTIPATING.  Take a stool softener or laxative such as senokot, pericolace, or milk of magnesia if needed.    Prescription given for Oxycodone.  Last pain medication given at 2:08pm.    If your physician has prescribed pain medication that includes Acetaminophen (Tylenol), do not take additional Acetaminophen (Tylenol) while taking the prescribed medication.    Depression / Suicide Risk    As you are discharged from this [facility], it  is important to learn how to keep safe from harming yourself.    Recognize the warning signs:  · Abrupt changes in personality, positive or negative- including increase in energy   · Giving away possessions  · Change in eating patterns- significant weight changes-  positive or negative  · Change in sleeping patterns- unable to sleep or sleeping all the time   · Unwillingness or inability to communicate  · Depression  · Unusual sadness, discouragement and loneliness  · Talk of wanting to die  · Neglect of personal appearance   · Rebelliousness- reckless behavior  · Withdrawal from people/activities they love  · Confusion- inability to concentrate     If you or a loved one observes any of these behaviors or has concerns about self-harm, here's what you can do:  · Talk about it- your feelings and reasons for harming yourself  · Remove any means that you might use to hurt yourself (examples: pills, rope, extension cords, firearm)  · Get professional help from the community (Mental Health, Substance Abuse, psychological counseling)  · Do not be alone:Call your Safe Contact- someone whom you trust who will be there for you.  · Call your local CRISIS HOTLINE 242-0287 or 872-742-7675  · Call your local Children's Mobile Crisis Response Team [place] (703) 628-1801 or www.[employer]  · Call the toll free National Suicide Prevention Hotlines   · National Suicide Prevention Lifeline 386-854-IFJZ (6544)  · National Hope Line Network 800-SUICIDE (596-0582)

## 2019-12-27 NOTE — OP REPORT
Post OP Note    PreOp Diagnosis: Right-sided spigelian hernia    PostOp Diagnosis: Same with cord lipoma    Procedure(s):  REPAIR, HERNIA, VENTRAL, ROBOT-ASSISTED, USING DA SULEMA XI- FOR SPIGELIAN WITH MESH - Wound Class: Clean    Surgeon(s):  MARK Whatley M.D.    Anesthesiologist/Type of Anesthesia:  Anesthesiologist: Umang Zelaya M.D./General    Surgical Staff:  Circulator: Lori Villarreal R.N.  Relief Scrub: Evelyne Simpson  Scrub Person: Dianna Waldrop R.N.    Specimens removed if any:  * No specimens in log *    Estimated Blood Loss: 5 cc    Findings: Cord lipoma and small fat-containing defect in the region of a spigelian hernia    Complications: No complications were noted    Indications: Patient is an otherwise healthy 24-year-old male who presents with a bulge in the region of a spigelian hernia.  It is tender but reducible.  Patient was counseled extensively as the risk first benefits of surgery and agreed to proceed fully informed.    Description:    After informed consent was obtained, the patient was prepped and draped in the standard sterile surgical fashion after induction of general anesthesia.  An appropriate timeout was performed and antibiotics delivered.  I began with a left upper quadrant Veress insertion and the abdomen was insufflated to 15 mmHg CO2.  3 robotic trochars were applied.  The robot was docked and I took my position at the console.    I began by creating a preperitoneal plane on the right-hand side.  I had the hernia marked previously so that it could be palpated during surgery.  In the region of the surgical defect was a small fat-containing defect which was easily reduced.    Since this is in the region of the right groin I proceeded to skeletonize the cord.  He did have a cord lipoma which was reduced to well within the abdomen.  Once I had created adequate space for the mesh with a critical view of the myopectineal orifice I laid this  against the abdominal wall.  I used a 10 x 15 cm uncut pro- mesh.  This had adequate coverage of the defect and the cord.    Once that was accomplished, I closed the peritoneal defect with a running 23 cm 2 oh strata fix suture.  This completely had the mesh from the abdomen.  This lay flat against the abdominal wall.  The sponge and instrument count was correct.  The ports were withdrawn and the skin edges reapproximated with Monocryl.  Dermabond was applied as a dressing.  The patient was then extubated, to recovery in satisfactory condition.        12/27/2019 1:49 PM Anastacio Mcpherson M.D.       never used

## 2019-12-27 NOTE — ANESTHESIA PROCEDURE NOTES
Airway  Date/Time: 12/27/2019 1:01 PM  Performed by: Umang Zelaya M.D.  Authorized by: Umang Zelaya M.D.     Location:  OR  Urgency:  Elective  Indications for Airway Management:  Anesthesia  Spontaneous Ventilation: absent    Sedation Level:  Deep  Preoxygenated: Yes    Patient Position:  Sniffing  Final Airway Type:  Endotracheal airway  Final Endotracheal Airway:  ETT  Cuffed: Yes    Technique Used for Successful ETT Placement:  Direct laryngoscopy  Insertion Site:  Oral  Blade Type:  Alison  Laryngoscope Blade/Videolaryngoscope Blade Size:  3  ETT Size (mm):  8.0  Measured from:  Teeth  ETT to Teeth (cm):  22  Placement Verified by: auscultation and capnometry    Cormack-Lehane Classification:  Grade IIa - partial view of glottis  Number of Attempts at Approach:  1

## 2019-12-27 NOTE — ANESTHESIA TIME REPORT
Anesthesia Start and Stop Event Times     Date Time Event    12/27/2019 1255 Ready for Procedure     1257 Anesthesia Start     1400 Anesthesia Stop        Responsible Staff  12/27/19    Name Role Begin End    Umang Zelaya M.D. Anesth 1257 1400        Preop Diagnosis (Free Text):  Pre-op Diagnosis     SPIGELIAN HERNIA        Preop Diagnosis (Codes):    Post op Diagnosis  Spigelian hernia      Premium Reason  Non-Premium    Comments:

## 2019-12-27 NOTE — PROGRESS NOTES
1518- Patient arrived in PACU II alert and oriented. Vital signs are within normal limits for the patient. Patient reports pain 3/10. Incisions are clean, dry, and intact. Discussed discharge plan of care and patient expressed understanding. All needs met at this time.    1545- Patient is resting comfortably. Patient feels like resting for a little while longer before getting dressed.    1555- Handoff report to FRANCISCO JAVIER Bingham.

## 2019-12-27 NOTE — ANESTHESIA PREPROCEDURE EVALUATION
Relevant Problems   CARDIAC   (+) Left varicocele       Physical Exam    Airway   Mallampati: II  TM distance: >3 FB  Neck ROM: full       Cardiovascular - normal exam  Rhythm: regular  Rate: normal  (-) murmur     Dental - normal exam         Pulmonary - normal exam  Breath sounds clear to auscultation     Abdominal   Abdomen: soft  Bowel sounds: normal   Neurological - normal exam                 Anesthesia Plan    ASA 1       Plan - general       Airway plan will be ETT        Induction: intravenous    Postoperative Plan: Postoperative administration of opioids is intended.    Pertinent diagnostic labs and testing reviewed    Informed Consent:    Anesthetic plan and risks discussed with patient.    Use of blood products discussed with: patient whom consented to blood products.

## 2019-12-27 NOTE — PROGRESS NOTES
Patient received from OR at 1358, bedside report received from anesthesia/OR RN, 2 patient identifiers confirmed . Patient connected to monitors, assessed for general head to toe and pain/nausea. Ordered reviewed and checked. Dad updated via telephone on patient status via phone as well as epic texting.  VSS, patient having tolerable pain at this time. Awake and appropriate. Patient meets criteria for D/C to phase II.

## 2019-12-27 NOTE — ANESTHESIA POSTPROCEDURE EVALUATION
Patient: Navarro Clark    Procedure Summary     Date:  12/27/19 Room / Location:  [facility] OR 11 / SURGERY [facility]    Anesthesia Start:  1257 Anesthesia Stop:  1400    Procedure:  REPAIR, HERNIA, VENTRAL, ROBOT-ASSISTED, USING DA SULEMA XI- FOR SPIGELIAN WITH MESH (Right Abdomen) Diagnosis:  (SPIGELIAN HERNIA)    Surgeon:  Anastacio Mcpherson M.D. Responsible Provider:  Umang Zelaya M.D.    Anesthesia Type:  general ASA Status:  1          Final Anesthesia Type: general  Last vitals  BP   Blood Pressure: 127/55    Temp   36.5 °C (97.7 °F)    Pulse   Pulse: (!) 55   Resp   18    SpO2   100 %      Anesthesia Post Evaluation    Patient location during evaluation: PACU  Patient participation: complete - patient participated  Level of consciousness: awake and alert  Pain score: 2    Airway patency: patent  Anesthetic complications: no  Cardiovascular status: hemodynamically stable  Respiratory status: acceptable  Hydration status: euvolemic    PONV: none           Nurse Pain Score: 1 (NPRS)

## 2019-12-27 NOTE — ANESTHESIA POSTPROCEDURE EVALUATION
Patient: Navarro Clark    Procedure Summary     Date:  12/27/19 Room / Location:  [facility] OR 11 / SURGERY [facility]    Anesthesia Start:  1257 Anesthesia Stop:  1400    Procedure:  REPAIR, HERNIA, VENTRAL, ROBOT-ASSISTED, USING DA SULEMA XI- FOR SPIGELIAN WITH MESH (Right Abdomen) Diagnosis:  (SPIGELIAN HERNIA)    Surgeon:  Anastacio Mcpherson M.D. Responsible Provider:  Umang Zelaya M.D.    Anesthesia Type:  general ASA Status:  1          Final Anesthesia Type: general  Last vitals  BP   Blood Pressure: 127/55    Temp   36.5 °C (97.7 °F)    Pulse   Pulse: (!) 55   Resp   18    SpO2   100 %      Anesthesia Post Evaluation    Patient location during evaluation: PACU  Patient participation: complete - patient participated  Level of consciousness: awake and alert  Pain score: 2    Airway patency: patent  Anesthetic complications: no  Cardiovascular status: hemodynamically stable  Respiratory status: acceptable  Hydration status: euvolemic    PONV: none           Nurse Pain Score: 1 (NPRS)

## 2019-12-28 NOTE — OR NURSING
VSS, pt steady with ambulation, meets discharge criteria. Discharged home with father. Wheeled to car with hospital escort. Rx already given to pt's father and already filled at outpatient pharmacy. [facility] CDI. IV dc'd, cathlon intact. Pt to f/u with physician as directed by physician. Pt to return to ER for any emergent sx. Pt verbalizes understanding of discharge instructions and all questions were answered.

## 2020-01-29 ENCOUNTER — TELEPHONE (OUTPATIENT)
Dept: MEDICAL GROUP | Facility: PHYSICIAN GROUP | Age: 25
End: 2020-01-29

## 2020-01-29 NOTE — TELEPHONE ENCOUNTER
VOICEMAIL  1. Caller Name: Navarro Dye Escorial                      Call Back Number: [phone] (home)     2. Message: Pt is requesting [employer] for school. Needs the Varicella Zoster IGG AB and HEP B Surface AB.  Please advise    3. Patient approves office to leave a detailed voicemail/MyChart message: yes

## 2020-01-30 DIAGNOSIS — Z20.820 EXPOSURE TO VARICELLA ZOSTER VIRUS (VZV): ICD-10-CM

## 2020-01-30 DIAGNOSIS — Z20.5 EXPOSURE TO HEPATITIS B: ICD-10-CM

## 2020-01-30 NOTE — TELEPHONE ENCOUNTER
1. Caller Name: Navarro Dye Escorial                        Call Back Number: [phone] (home)       How would the patient prefer to be contacted with a response: Phone call OK to leave a detailed message    Pt has been advised the Lab orders are in the system and can go to any [facility] lab to get them done.

## 2020-02-11 ENCOUNTER — OFFICE VISIT (OUTPATIENT)
Dept: DERMATOLOGY | Facility: IMAGING CENTER | Age: 25
End: 2020-02-11
Payer: COMMERCIAL

## 2020-02-11 DIAGNOSIS — L63.9 ALOPECIA AREATA: ICD-10-CM

## 2020-02-11 PROCEDURE — 11900 INJECT SKIN LESIONS </W 7: CPT | Performed by: DERMATOLOGY

## 2020-02-11 NOTE — PROGRESS NOTES
DERMATOLOGY NOTE  FOLLOW UP VISIT       Chief complaint: No chief complaint on file.       Navarro Clark is a 24 y.o. male who presents for     Follow-up, alopecia areata   Per patient has improved significantly. Left side spot had complete hair regrowth and right side spot had a good amount of regrowth. Still mild itching. Continues to apply topical tofacitinib 2% cream BID. Feels that ILK injections helped. No new spots.    History of skin cancer: No  History of biopsies:No  History of blistering/severe sunburns:No  Family history of skin cancer:Yes, Details: mother- SCC  Family history of atypical moles:No      Past Medical History:   Diagnosis Date   • Allergy    • Fracture clavicle-closed         Family History   Problem Relation Age of Onset   • Diabetes Brother    • Cancer Maternal Grandfather         lung   • Stroke Paternal Grandmother    • Stroke Paternal Grandfather         Social History     Socioeconomic History   • Marital status: Single     Spouse name: Not on file   • Number of children: Not on file   • Years of education: Not on file   • Highest education level: Not on file   Occupational History   • Not on file   Social Needs   • Financial resource strain: Not on file   • Food insecurity:     Worry: Not on file     Inability: Not on file   • Transportation needs:     Medical: Not on file     Non-medical: Not on file   Tobacco Use   • Smoking status: Never Smoker   • Smokeless tobacco: Never Used   Substance and Sexual Activity   • Alcohol use: Yes     Comment: once a month   • Drug use: No   • Sexual activity: Not Currently     Partners: Female   Lifestyle   • Physical activity:     Days per week: Not on file     Minutes per session: Not on file   • Stress: Not on file   Relationships   • Social connections:     Talks on phone: Not on file     Gets together: Not on file     Attends [employer] service: Not on file     Active member of club or organization: Not on file     Attends meetings  of clubs or organizations: Not on file     Relationship status: Not on file   • Intimate partner violence:     Fear of current or ex partner: Not on file     Emotionally abused: Not on file     Physically abused: Not on file     Forced sexual activity: Not on file   Other Topics Concern   • Not on file   Social History Narrative   • Not on file        Allergies   Allergen Reactions   • Nkda [No Known Drug Allergy]         MEDICATIONS:  Medications relevant to specialty reviewed.    Current Outpatient Medications:   •  ibuprofen (MOTRIN) 200 MG Tab, Take 600 mg by mouth every 8 hours as needed., Disp: , Rfl:   •  GLUCOSAMINE-CHONDROITIN PO, Take 1 Tab by mouth every day., Disp: , Rfl:   •  FIBER PO, Take 1 Tab by mouth every day., Disp: , Rfl:   •  Multiple Vitamins-Minerals (MULTIVITAMIN ADULT PO), Take 1 Tab by mouth every day., Disp: , Rfl:   •  Omega-3 Fatty Acids (FISH OIL) 1000 MG Cap capsule, Take 1,000 mg by mouth every day., Disp: , Rfl:      REVIEW OF SYSTEMS:   Positive for none  Negative for fevers and chills  All other systems reviewed and are negative.     EXAM:  There were no vitals taken for this visit.  Constitutional: Well-developed, well-nourished, and in no distress.   HENT:   Head: normocephalic and atraumatic.  Right Ear: External ear normal.   Left Ear: External ear normal.   Nose: Nose normal.    Eyes: Conjunctivae and lids are normal.   Neck: Normal range of motion. Neck supple.   Pulmonary/Chest: Effort normal.   Neurological: Alert and oriented.    A neck up skin exam was performed including the scalp, hair, face, eyelids, nose, lips, ears and neck. Notable findings on exam today listed below. Remaining above-listed examined areas within normal limits / negative for rashes or lesions.    -right occipital occipital scalp with quarter sized patch of slightly decreased hair density  -left occipital scalp normal on exam today     IMPRESSION / PLAN:    1. Alopecia areata - still with one  significantly improved but persistent area, +mild itching  - We reviewed alopecia areata, its expected clinical course, its association with other autoimmune conditions such as autoimmune thyroidism and vitiligo, and the risks and benefits of alternative treatment options.  - Patient with good response to ILK + topical tofacitinib.  - repeat ILK to right occipital scalp today  Procedure: Informed consent was obtained.  The procedure, risks and complications including scar, bleeding, discoloration, atrophy, infection, recurrence were explained in detail and understood by the patient.  The area(s) above was infiltrated with 5mg/ml of kenalog. Total amount injected: 0.2 ml. The patient tolerated the procedure well.    - cont topical tofacitinib 2% cream BID to aa   - call or rtc with any new areas of concern    Return to clinic in: Return if symptoms worsen or fail to improve. and as needed for any new or changing skin lesions.    Jerrica Sprague M.D.

## 2020-02-16 ENCOUNTER — OFFICE VISIT (OUTPATIENT)
Dept: URGENT CARE | Facility: PHYSICIAN GROUP | Age: 25
End: 2020-02-16
Payer: COMMERCIAL

## 2020-02-16 VITALS
WEIGHT: 180 LBS | SYSTOLIC BLOOD PRESSURE: 132 MMHG | DIASTOLIC BLOOD PRESSURE: 82 MMHG | OXYGEN SATURATION: 100 % | HEIGHT: 71 IN | HEART RATE: 79 BPM | TEMPERATURE: 98.2 F | RESPIRATION RATE: 18 BRPM | BODY MASS INDEX: 25.2 KG/M2

## 2020-02-16 DIAGNOSIS — J00 ACUTE NASOPHARYNGITIS (COMMON COLD): ICD-10-CM

## 2020-02-16 DIAGNOSIS — J02.9 SORE THROAT: ICD-10-CM

## 2020-02-16 LAB
INT CON NEG: NORMAL
INT CON POS: NORMAL
S PYO AG THROAT QL: NEGATIVE

## 2020-02-16 PROCEDURE — 87880 STREP A ASSAY W/OPTIC: CPT | Performed by: NURSE PRACTITIONER

## 2020-02-16 PROCEDURE — 99213 OFFICE O/P EST LOW 20 MIN: CPT | Performed by: NURSE PRACTITIONER

## 2020-02-16 ASSESSMENT — ENCOUNTER SYMPTOMS
MYALGIAS: 0
TROUBLE SWALLOWING: 0
FEVER: 0
DIARRHEA: 0
HOARSE VOICE: 0
NAUSEA: 0
NECK PAIN: 0
COUGH: 0
HEADACHES: 0
CHILLS: 0
SORE THROAT: 1

## 2020-02-16 NOTE — PROGRESS NOTES
Subjective:      Naavrro Clark is a 24 y.o. male who presents with Pharyngitis (runny [id number] )            Pharyngitis    This is a new problem. The current episode started in the past 7 days. The problem has been unchanged. Neither side of throat is experiencing more pain than the other. There has been no fever. Associated symptoms include congestion. Pertinent negatives include no coughing, diarrhea, ear pain, headaches, hoarse voice, neck pain or trouble swallowing. He has tried NSAIDs and gargles for the symptoms.     Nkda [no known drug allergy]  Current Outpatient Medications on File Prior to Visit   Medication Sig Dispense Refill   • ibuprofen (MOTRIN) 200 MG Tab Take 600 mg by mouth every 8 hours as needed.     • GLUCOSAMINE-CHONDROITIN PO Take 1 Tab by mouth every day.     • FIBER PO Take 1 Tab by mouth every day.     • Multiple Vitamins-Minerals (MULTIVITAMIN ADULT PO) Take 1 Tab by mouth every day.     • Omega-3 Fatty Acids (FISH OIL) 1000 MG Cap capsule Take 1,000 mg by mouth every day.       No current facility-administered medications on file prior to visit.      Social History     Socioeconomic History   • Marital status: Single     Spouse name: Not on file   • Number of children: Not on file   • Years of education: Not on file   • Highest education level: Not on file   Occupational History   • Not on file   Social Needs   • Financial resource strain: Not on file   • Food insecurity     Worry: Not on file     Inability: Not on file   • Transportation needs     Medical: Not on file     Non-medical: Not on file   Tobacco Use   • Smoking status: Never Smoker   • Smokeless tobacco: Never Used   Substance and Sexual Activity   • Alcohol use: Yes     Comment: once a month   • Drug use: No   • Sexual activity: Not Currently     Partners: Female   Lifestyle   • Physical activity     Days per week: Not on file     Minutes per session: Not on file   • Stress: Not on file   Relationships   • Social  "connections     Talks on phone: Not on file     Gets together: Not on file     Attends Synagogue service: Not on file     Active member of club or organization: Not on file     Attends meetings of clubs or organizations: Not on file     Relationship status: Not on file   • Intimate partner violence     Fear of current or ex partner: Not on file     Emotionally abused: Not on file     Physically abused: Not on file     Forced sexual activity: Not on file   Other Topics Concern   • Not on file   Social History Narrative   • Not on file     Breast Cancer-related family history is not on file.    Review of Systems   Constitutional: Negative for chills and fever.   HENT: Positive for congestion and sore throat. Negative for ear pain, hoarse voice and trouble swallowing.    Respiratory: Negative for cough.    Gastrointestinal: Negative for diarrhea and nausea.   Musculoskeletal: Negative for myalgias and neck pain.   Neurological: Negative for headaches.          Objective:     /82   Pulse 79   Temp 36.8 °C (98.2 °F) (Temporal)   Resp 18   Ht 1.803 m (5' 11\")   Wt 81.6 kg (180 lb)   SpO2 100%   BMI 25.10 kg/m²      Physical Exam  Vitals signs reviewed.   Constitutional:       General: He is not in acute distress.     Appearance: He is well-developed.   HENT:      Head: Normocephalic.      Right Ear: Tympanic membrane and external ear normal.      Left Ear: Tympanic membrane and external ear normal.      Nose: Mucosal edema and rhinorrhea present.      Mouth/Throat:      Pharynx: Posterior oropharyngeal erythema present.   Eyes:      General:         Right eye: No discharge.         Left eye: No discharge.      Conjunctiva/sclera: Conjunctivae normal.   Neck:      Musculoskeletal: Normal range of motion and neck supple.   Cardiovascular:      Rate and Rhythm: Normal rate and regular rhythm.      Heart sounds: Normal heart sounds.   Pulmonary:      Effort: Pulmonary effort is normal.      Breath sounds: Normal " breath sounds.   Musculoskeletal: Normal range of motion.   Lymphadenopathy:      Cervical: No cervical adenopathy.   Skin:     General: Skin is warm and dry.   Neurological:      Mental Status: He is alert and oriented to person, place, and time.   Psychiatric:         Behavior: Behavior normal.         Thought Content: Thought content normal.                 Assessment/Plan:       1. Acute nasopharyngitis (common cold)     2. Sore throat  POCT Rapid Strep A     Strep negative.  Viral illness at this time with no indication for antibiotics. Reviewed with patient expected course of illness and also reviewed OTC medications that may be used for symptom relief. Follow up 7-10 days if not improving.

## 2020-12-22 ENCOUNTER — APPOINTMENT (OUTPATIENT)
Dept: FAMILY PLANNING/WOMEN'S HEALTH CLINIC | Facility: IMMUNIZATION CENTER | Age: 25
End: 2020-12-22
Attending: FAMILY MEDICINE
Payer: COMMERCIAL

## 2020-12-22 DIAGNOSIS — Z23 NEED FOR VACCINATION: ICD-10-CM

## 2020-12-22 PROCEDURE — 0001A PFIZER SARS-COV-2 VACCINE: CPT

## 2020-12-22 PROCEDURE — 91300 PFIZER SARS-COV-2 VACCINE: CPT

## 2020-12-23 ENCOUNTER — IMMUNIZATION (OUTPATIENT)
Dept: FAMILY PLANNING/WOMEN'S HEALTH CLINIC | Facility: IMMUNIZATION CENTER | Age: 25
End: 2020-12-23

## 2020-12-23 DIAGNOSIS — Z23 ENCOUNTER FOR VACCINATION: Primary | ICD-10-CM

## 2021-01-12 ENCOUNTER — IMMUNIZATION (OUTPATIENT)
Dept: FAMILY PLANNING/WOMEN'S HEALTH CLINIC | Facility: IMMUNIZATION CENTER | Age: 26
End: 2021-01-12
Attending: FAMILY MEDICINE
Payer: COMMERCIAL

## 2021-01-12 DIAGNOSIS — Z23 ENCOUNTER FOR VACCINATION: Primary | ICD-10-CM

## 2021-01-12 PROCEDURE — 0002A PFIZER SARS-COV-2 VACCINE: CPT

## 2021-01-12 PROCEDURE — 91300 PFIZER SARS-COV-2 VACCINE: CPT

## (undated) DEVICE — PROTECTOR ULNA NERVE - (36PR/CA)

## (undated) DEVICE — KIT ROOM DECONTAMINATION

## (undated) DEVICE — SHEARS MONOPOLAR CURVED  DA VINCI 10X'S REUSABLE

## (undated) DEVICE — KIT ANESTHESIA W/CIRCUIT & 3/LT BAG W/FILTER (20EA/CA)

## (undated) DEVICE — SUTURE 2-0 30CM STRATAFIX SPIRAL PDO ***WAS PART #SXPD1B401 *****

## (undated) DEVICE — SUTURE 2-0 VICRYL PLUS SH - 27 INCH (36/BX)

## (undated) DEVICE — CHLORAPREP 26 ML APPLICATOR - ORANGE TINT(25/CA)

## (undated) DEVICE — SLEEVE, VASO, THIGH, MED

## (undated) DEVICE — DRAPE COLUMN  BOX OF 20

## (undated) DEVICE — COVER TIP ENDOWRIST HOT SHEAR - (10EA/BX) DA VINCI

## (undated) DEVICE — HEAD HOLDER JUNIOR/ADULT

## (undated) DEVICE — NEEDLE INSFL 120MM 14GA VRRS - (20/BX)

## (undated) DEVICE — GOWN WARMING STANDARD FLEX - (30/CA)

## (undated) DEVICE — SENSOR SPO2 NEO LNCS ADHESIVE (20/BX) SEE USER NOTES

## (undated) DEVICE — SYRINGE 30 ML LS (56/BX)

## (undated) DEVICE — CANISTER SUCTION 3000ML MECHANICAL FILTER AUTO SHUTOFF MEDI-VAC NONSTERILE LF DISP  (40EA/CA)

## (undated) DEVICE — SET LEADWIRE 5 LEAD BEDSIDE DISPOSABLE ECG (1SET OF 5/EA)

## (undated) DEVICE — DERMABOND ADVANCED - (12EA/BX)

## (undated) DEVICE — LACTATED RINGERS INJ 1000 ML - (14EA/CA 60CA/PF)

## (undated) DEVICE — OBTURATOR BLADELESS STANDARD 8MM (6EA/BX)

## (undated) DEVICE — SUCTION INSTRUMENT YANKAUER BULBOUS TIP W/O VENT (50EA/CA)

## (undated) DEVICE — SET EXTENSION WITH 2 PORTS (48EA/CA) ***PART #2C8610 IS A SUBSTITUTE*****

## (undated) DEVICE — ROBOTIC SURGERY SERVICES

## (undated) DEVICE — SUTURE GENERAL

## (undated) DEVICE — SYSTEM CLEARIFY VISUALIZATION (10EA/PK)

## (undated) DEVICE — ELECTRODE DUAL RETURN W/ CORD - (50/PK)

## (undated) DEVICE — DRAPE IOBAN II INCISE 23X17 - (10EA/BX 4BX/CA)

## (undated) DEVICE — Device

## (undated) DEVICE — SEAL 5MM-8MM UNIVERSAL  BOX OF 10

## (undated) DEVICE — DRAPE ARM  BOX OF 20

## (undated) DEVICE — NEEDLE DRIVER MEGA SUTURECUT DA VINCI 15X'S REUSABLE

## (undated) DEVICE — NEPTUNE 4 PORT MANIFOLD - (20/PK)

## (undated) DEVICE — ELECTRODE 850 FOAM ADHESIVE - HYDROGEL RADIOTRNSPRNT (50/PK)

## (undated) DEVICE — GLOVE BIOGEL SZ 8 SURGICAL PF LTX - (50PR/BX 4BX/CA)

## (undated) DEVICE — SUTURE 4-0 MONOCRYL PLUS PS-2 - 27 INCH (36/BX)

## (undated) DEVICE — MASK ANESTHESIA ADULT  - (100/CA)

## (undated) DEVICE — TUBING CLEARLINK DUO-VENT - C-FLO (48EA/CA)